# Patient Record
Sex: FEMALE | Race: OTHER | HISPANIC OR LATINO | Employment: FULL TIME | URBAN - METROPOLITAN AREA
[De-identification: names, ages, dates, MRNs, and addresses within clinical notes are randomized per-mention and may not be internally consistent; named-entity substitution may affect disease eponyms.]

---

## 2017-02-24 ENCOUNTER — ALLSCRIPTS OFFICE VISIT (OUTPATIENT)
Dept: OTHER | Facility: OTHER | Age: 32
End: 2017-02-24

## 2017-02-24 LAB — HCG, QUALITATIVE (HISTORICAL): NEGATIVE

## 2018-01-02 ENCOUNTER — HOSPITAL ENCOUNTER (EMERGENCY)
Facility: HOSPITAL | Age: 33
Discharge: HOME | End: 2018-01-02
Attending: EMERGENCY MEDICINE
Payer: COMMERCIAL

## 2018-01-02 ENCOUNTER — APPOINTMENT (EMERGENCY)
Dept: RADIOLOGY | Facility: HOSPITAL | Age: 33
End: 2018-01-02
Payer: COMMERCIAL

## 2018-01-02 VITALS
OXYGEN SATURATION: 99 % | SYSTOLIC BLOOD PRESSURE: 124 MMHG | RESPIRATION RATE: 18 BRPM | HEART RATE: 95 BPM | WEIGHT: 199.08 LBS | DIASTOLIC BLOOD PRESSURE: 83 MMHG | TEMPERATURE: 98.4 F

## 2018-01-02 DIAGNOSIS — J06.9 VIRAL UPPER RESPIRATORY ILLNESS: Primary | ICD-10-CM

## 2018-01-02 LAB — EXT PREG TEST URINE: NEGATIVE

## 2018-01-02 PROCEDURE — 81025 URINE PREGNANCY TEST: CPT | Performed by: PHYSICIAN ASSISTANT

## 2018-01-02 PROCEDURE — 71046 X-RAY EXAM CHEST 2 VIEWS: CPT

## 2018-01-02 PROCEDURE — 99283 EMERGENCY DEPT VISIT LOW MDM: CPT

## 2018-01-02 RX ORDER — PROMETHAZINE HYDROCHLORIDE AND CODEINE PHOSPHATE 6.25; 1 MG/5ML; MG/5ML
5 SYRUP ORAL EVERY 4 HOURS PRN
Qty: 120 ML | Refills: 0 | Status: SHIPPED | OUTPATIENT
Start: 2018-01-02 | End: 2018-01-12

## 2018-01-02 NOTE — ED PROVIDER NOTES
History  Chief Complaint   Patient presents with    Fever - 9 weeks to 74 years     Fever (89) and cough for the past week  Patient presents emergency department with upper respiratory symptoms  She has had 1-2 weeks cough and some subjective fevers  Daughter also has similar symptoms  He taking over-the-counter medications not cough  None       History reviewed  No pertinent past medical history  History reviewed  No pertinent surgical history  History reviewed  No pertinent family history  I have reviewed and agree with the history as documented  Social History   Substance Use Topics    Smoking status: Never Smoker    Smokeless tobacco: Never Used    Alcohol use No        Review of Systems   All other systems reviewed and are negative  Physical Exam  ED Triage Vitals [01/02/18 1313]   Temperature Pulse Respirations Blood Pressure SpO2   98 4 °F (36 9 °C) 95 18 124/83 99 %      Temp Source Heart Rate Source Patient Position - Orthostatic VS BP Location FiO2 (%)   Temporal -- -- -- --      Pain Score       No Pain           Orthostatic Vital Signs  Vitals:    01/02/18 1313   BP: 124/83   Pulse: 95       Physical Exam   Constitutional: She is oriented to person, place, and time  She appears well-developed  HENT:   Right Ear: External ear normal    Eyes: Conjunctivae and EOM are normal    Neck: Neck supple  Cardiovascular: Normal rate, regular rhythm and normal heart sounds  Pulmonary/Chest: Effort normal and breath sounds normal    Abdominal: Soft  Bowel sounds are normal    Neurological: She is alert and oriented to person, place, and time  Skin: Skin is warm  Psychiatric: She has a normal mood and affect  Her behavior is normal    Nursing note and vitals reviewed        ED Medications  Medications - No data to display    Diagnostic Studies  Results Reviewed     Procedure Component Value Units Date/Time    POCT pregnancy, urine [[id number]]  (Normal) Resulted: 01/02/18 1349    Lab Status:  Final result Updated:  01/02/18 1349     EXT PREG TEST UR (Ref: Negative) negative                 XR chest 2 views   ED Interpretation by Ezekiel Post PA-C (01/02 5834)   DEDIRE                 Procedures  Procedures       Phone Contacts  ED Phone Contact    ED Course  ED Course                                MDM  Number of Diagnoses or Management Options  Viral upper respiratory illness: new and requires workup     Amount and/or Complexity of Data Reviewed  Independent visualization of images, tracings, or specimens: yes    Patient Progress  Patient progress: improved    CritCare Time    Disposition  Final diagnoses:   Viral upper respiratory illness     Time reflects when diagnosis was documented in both MDM as applicable and the Disposition within this note     Time User Action Codes Description Comment    1/2/2018  2:22 PM Tiffanie Christianson [J06 [id number],  B97 [id number]] Viral upper respiratory illness       ED Disposition     ED Disposition Condition Comment    Discharge  Carol Abarca discharge to home/self care  Condition at discharge: Good        Follow-up Information     Follow up With Specialties Details Why [address], [place] [facility]   [address] [place] [address]  49  [phone]          Patient's Medications   Discharge Prescriptions    PROMETHAZINE-CODEINE (PHENERGAN WITH CODEINE) 6 25-10 MG/5 ML SYRUP    Take 5 mL by mouth every 4 (four) hours as needed for cough for up to 10 days       Start Date: 1/2/2018  End Date: 1/12/2018       Order Dose: 5 mL       Quantity: 120 mL    Refills: 0     No discharge procedures on file      ED Provider  Electronically Signed by           Ezekiel Post PA-C  01/02/18 9868

## 2018-01-02 NOTE — DISCHARGE INSTRUCTIONS
Tylenol or Motrin for fevers/pain  Saline spray for congestion you may use Mucinex for cough and congestion increase, fluids follow-up with the family doctor  Return to the emergency department for worsening symptoms  Síndrome viral   LO QUE NECESITA SABER:   El síndrome viral es un término general usado para describir juan c infección viral que no tiene juan c causa definida  Los virus son propagados fácilmente de Whitley Welsh persona a otra a través del aire y [place] los objetos que se comparten  INSTRUCCIONES SOBRE [facility]ITAL[facility]:   Llame al 911 en shayne de presentar lo siguiente:   · Usted sufre juan c convulsión  · No es posible despertarlo  · Usted tiene dolor torácico y dificultad para respirar  Regrese a la camron de emergencias si:   · Usted tiene rigidez en el delores, dolor de sd intenso y sensibilidad a la bernard  · Usted se siente débil, mareado o confundido  · Usted yoselin de orinar u orina menos de lo normal      · Usted tose wayne o juan c mucosidad espesa amarilla o silvia  · Usted tiene dolor abdominal severo o harman abdomen está más sebas de lo habitual   Pregúntele a harman médico qué vitaminas y minerales son adecuados para usted  · Tamara síntomas no mejoran con el tratamiento o empeoran después de 3 días  · Usted tiene salpullido o dolor de oído  · Usted siente ardor al Catarina Smith  · Usted tiene preguntas o inquietudes acerca de harman condición o cuidado  Medicamentos:  Es posible que usted necesite alguno de los siguientes:  · El acetaminofén  vito el dolor y baja la fiebre  Está disponible sin receta médica  Pregunte cuánto medicamento debe newton y con qué frecuencia  [address]  El acetaminofén puede causar daño en el hígado cuando no se saida de forma correcta  · AINEs (Analgésicos antiinflamatorios no esteroides) lily el ibuprofeno, ayudan a disminuir la inflamación, el dolor y la [place]   Los AINEs pueden causar sangrado estomacal o problemas renales en ciertas personas  Si usted saida un medicamento anticoagulante, siempre pregúntele a harman médico si los AMELIA son seguros para usted  Siempre evie la etiqueta de ese medicamento y [place] instrucciones  · El medicamento para el resfriado  ayuda a disminuir la inflamación, a controlar la tos y a aliviar la congestión del pecho o nasal      · El rociador nasal de agua salina  ayuda a disminuir la congestión nasal      · [place] colin medicamentos lily se le haya indicado  Consulte con harman médico si usted macie que harman medicamento no le está ayudando o si presenta efectos secundarios  Infórmele si es alérgico a algún medicamento  Mantenga juan c lista actualizada de los [place], las vitaminas y los productos herbales que saida  Incluya los siguientes datos de los medicamentos: cantidad, frecuencia y motivo de administración  Traiga con usted la lista o los envases de la píldoras a colin citas de seguimiento  Lleve la lista de los medicamentos con usted en shayne de juan c emergencia  El manejo de harman síntomas:   · Consuma líquidos según le indicaron  para evitar la deshidratación  Pregunte cuánto líquido debe newton cada día y cuáles líquidos son los más adecuados para usted  Pregunte si usted debería newton juan c solución de rehidratación oral (SRO)  [address] cantidades exactas de agua, sal y azúcar que usted necesita para reemplazar los líquidos corporales  [place] podría ayudarlo a evitar la deshidratación a causa del vómito y de la diarrea  No tome líquidos con cafeína  Los líquidos con cafeína pueden empeorar la deshidratación  · Descanse lo suficiente  para ayudar a que harman cuerpo sane  [place] siestas aditya el día  Pregunte a harman médico cuándo puede regresar a harman trabajo y a colin actividades cotidianas  · Use un humidificador de anuja frío  para ayudarlo a respirar más fácilmente si usted tiene congestión nasal o del pecho  Pregunte a harman médico cómo usar un humidificador de vapor frío       · Coma miel o use caramelos para la tos  para ayudar a disminuir la molestia de la garganta  Pregunte a awad médico cuánta miel debería comer cada día  Los caramelos para la tos están disponibles sin receta médica  Siga las indicaciones para newton los caramelos para la tos  · No fume y permanezca lejos de otras personas que fuman  La nicotina y otras sustancias químicas que contienen los cigarrillos y cigarros pueden dañar los pulmones  El fumar también puede retrasar la sanación  Pida información a awad médico si usted actualmente fuma y necesita ayuda para dejar de fumar  Los cigarrillos electrónicos o tabaco sin humo todavía contienen nicotina  Consulte con awad médico antes de [employer]  · Lávese las dilcia frecuentemente  para evitar la propagación de gérmenes a otras personas  Utilice agua y [place]  Use gel antibacterial cuando no tenga jabón ni agua disponibles  [employer] las dilcia después de usar el baño, toser o estornudar  Lávese las dilcia antes de comer o preparar alimentos  Acuda a colin consultas de control con awad médico según le indicaron  Anote colin preguntas para que se acuerde de hacerlas aditya colin visitas  © 2017 [facility] Information is for End User's use only and may not be sold, redistributed or otherwise used for commercial purposes  All illustrations and images included in CareNotes® are the copyrighted property of A D A M , Inc  or Doug Ospina  Esta información es sólo para uso en educación  Awad intención no es darle un consejo médico sobre enfermedades o tratamientos  Colsulte con awad [place] farmacéutico antes de seguir cualquier régimen médico para saber si es seguro y efectivo para usted  Infección respiratoria superior   LO QUE NECESITA SABER:   [place] infección respiratoria superior también se conoce lily el resfriado común  Brittany es alexa infección que puede afectar awad nariz, garganta, oídos y los senos frontales   Para personas saludables, el resfriado común generalmente no es grave y no requiere tratamiento especial  Los síntomas del resfriado generalmente son [place] graves aditya los primeros 3 a 5 días  [address] en 7 a 14 días  Puede que usted siga tosiendo por 2 a 3 semanas  Los resfriados son provocados por virus y no mejoran con antibióticos  INSTRUCCIONES SOBRE EL LU[facility]NA [facility]OSPITALARIA:   Regrese a la camron de emergencias si:   · Usted tiene dolor torácico y dificultad para respirar  Pregúntele a harman Dima Obey vitaminas y minerales son adecuados para usted  · Usted tiene fiebre de más de 102ºF [facility])  · Harman garganta irritada empeora o usted ve manchas danna o jeanette en harman garganta  · Tamara síntomas empeoran después de 3 a 5 días o harman resfriado no mejora en 14 días  · Usted tiene sarpullido en alguna parte de harman piel  · Usted tiene bultos grandes y sensible en harman delores    · Usted tiene secreción espesa de color silvia o amarillo proveniente de harman nariz  · Usted expectora mucosidad de color amarillo, silvia o con [place]  · Usted vomita por más de 24 horas y no puede retener líquidos en el estómago  · Usted tiene un grave dolor de oído  · Usted tiene preguntas o inquietudes acerca de harman condición o cuidado  Medicamentos:  Es posible que usted necesite alguno de los siguientes:  · Los descongestionantes  ayudan a reducir la congestión nasal y Erick Scarlet a respirar más fácilmente  Si saida pastillas descongestionantes, pueden causarle agitación o problemas para dormir  No use aerosol descongestionante por más de unos cuantos días  · Los jarabes para la tos  ayudan a reducir la tos  Pregúntele a harman médico cuál tipo de medicamento para la tos es mejor para usted  · AINEs (Analgésicos antiinflamatorios no esteroides) lily el ibuprofeno, ayudan a disminuir la inflamación, el dolor y la [place]  Los AINEs pueden causar sangrado estomacal o problemas renales en ciertas personas   Si usted saida un medicamento anticoagulante, siempre pregúntele a harman médico si los AMELIA son seguros para usted  Siempre melanie la etiqueta de ese medicamento y [place] instrucciones  · Acetaminofeno:  vito el dolor y baja la fiebre  Está disponible sin receta médica  Pregunte la cantidad y la frecuencia con que debe tomarlos  [address]  Melanie las etiquetas de todos los demás medicamentos que esté usando para saber si también contienen acetaminofén, o pregunte a harman médico o farmacéutico  El acetaminofén puede causar daño en el hígado cuando no se saida de forma correcta  No use más de 4 gramos (4000 miligramos) en total de acetaminofeno en un día  · [place] colin medicamentos lily se le haya indicado  Consulte con harman médico si usted macie que harman medicamento no le está ayudando o si presenta efectos secundarios  Infórmele si es alérgico a cualquier medicamento  Mantenga juan c lista actualizada de los [place], las vitaminas y los productos herbales que saida  Incluya los siguientes datos de los medicamentos: cantidad, frecuencia y motivo de administración  Traiga con usted la lista o los envases de la píldoras a colin citas de seguimiento  Lleve la lista de los medicamentos con usted en shayne de juan c emergencia  Acuda a colin consultas de control con harman médico según le indicaron  Anote colin preguntas para que se acuerde de hacerlas aditya colin visitas  Cuidados personales:   · Descanse el mayor tiempo posible  Empiece a hacer un poco más día a día  · [employer] líquidos lily se le indique  Los líquidos le ayudarán a aflojar y disolver la mucosidad para que la pueda expectorar  Los líquidos ayudarán a evitar la deshidratación  Los líquidos que ayudan a prevenir la deshidratación pueden ser Blinda Knife y caldo  No tome líquidos que contienen cafeína  La cafeína puede aumentar el riesgo de deshidratación  Pregúntele a harman médico cuál es la cantidad de líquido que necesita ingerir a diario  · Alivie el dolor de garganta  [place] gárgaras de agua tibia con sal  [employer] ayuda a aliviar el dolor de garganta  Prepare agua salina disolviendo ¼ de cucharada de sal a 1 taza de agua tibia  Usted puede también chupar dulces duros o pastillas para la garganta  Usted puede usar aerosol para el dolor de garganta  · Use un humidificador o vaporizador  Use un humidificador de anuja frío o un vaporizador para elevar la humedad en harman casa  [place] podría ayudarle a respirar más fácilmente y al mismo tiempo disminuir la tos  · Use gotas nasales de solución salina lily se le haya indicado  Estas ayudan a [place]  · Aplique vaselina en la parte externa alrededor de las fosas nasales  Esta puede disminuir la irritación de sonarse la nariz  · No fume  La nicotina y otros químicos en los cigarrillos y cigarros pueden empeorar colin síntomas  También pueden causar infecciones lily la bronquitis o la [place]  Pida información a harman médico si usted actualmente fuma y necesita ayuda para dejar de fumar  Los cigarrillos electrónicos o tabaco sin humo todavía contienen nicotina  Consulte con harman médico antes de [employer]  Evite transmitir harman resfriado a los demás:   · Trate de mantenerse alejado de otras personas aditya los primeros 2 a 3 días de harman resfriado cuando éste es más fácil de transmitir  · No comparta alimentos o bebidas  · No comparta toallas de mano con otros miembros de marlena en el hogar  · [employer] frecuentemente, especialmente después de sonarse la nariz  Tom la espalda a otras personas y cúbrase la boca y la nariz con un pañuelo desechable cuando estornuda o tose  © 2017 [address] Information is for End User's use only and may not be sold, redistributed or otherwise used for commercial purposes  All illustrations and images included in CareNotes® are the copyrighted property of A D A M , Inc  or Doug Ospina    Esta información es sólo para uso en educación  Harman intención no es darle un consejo médico sobre enfermedades o tratamientos  Colsulte con harman [place] farmacéutico antes de seguir cualquier régimen médico para saber si es seguro y efectivo para usted

## 2018-01-11 ENCOUNTER — HOSPITAL ENCOUNTER (EMERGENCY)
Facility: HOSPITAL | Age: 33
Discharge: HOME | End: 2018-01-11
Admitting: EMERGENCY MEDICINE
Payer: COMMERCIAL

## 2018-01-11 VITALS
HEART RATE: 93 BPM | RESPIRATION RATE: 16 BRPM | WEIGHT: 200.4 LBS | OXYGEN SATURATION: 100 % | DIASTOLIC BLOOD PRESSURE: 63 MMHG | SYSTOLIC BLOOD PRESSURE: 135 MMHG | TEMPERATURE: 97.8 F

## 2018-01-11 DIAGNOSIS — Z32.01 POSITIVE PREGNANCY TEST: Primary | ICD-10-CM

## 2018-01-11 LAB — EXT PREG TEST URINE: POSITIVE

## 2018-01-11 PROCEDURE — 99283 EMERGENCY DEPT VISIT LOW MDM: CPT

## 2018-01-11 PROCEDURE — 81025 URINE PREGNANCY TEST: CPT | Performed by: PHYSICIAN ASSISTANT

## 2018-01-11 NOTE — DISCHARGE INSTRUCTIONS
Un embarazo,   LO QUE NECESITA SABER:   Un embarazo normal dura alrededor de 40 semanas  El primer trimestre dura desde harman último periodo hasta la semana 12 de [place]  El keith trimestre se extiende desde la semana 13 de harman embarazo hasta la semana 23  El tercer trimestre se extiende desde la semana 24 de embarazo hasta que nazca harman bebé  Si usted conoce la fecha de harman último periodo, harman médico puede calcular la fecha de nacimiento de harman bebé  Es posible que usted de a bernard a harman bebé en cualquier momento desde la semana 37 hasta 2 semanas después de la fecha calculada de Lonnie  INSTRUCCIONES SOBRE EL [facility]ISA[facility] HOSPITALARIA:   Regrese a la camron de emergencias si:   · Usted presenta un lindy dolor de sd que no desaparece  · Usted tiene cambios en la visión nuevos o en aumento, lily visión borrosa o con manchas  · Usted tiene inflamación nueva o creciente en harman peewee o dilcia  · Usted tiene dolor o cólicos en el abdomen o la parte baja de la espalda  · Usted tiene sangrado vaginal   Comuníquese con harman médico o obstreta si:   · Usted tiene calambres, presión o tensión abdominal     · Usted tiene un cambio en la secreción vaginal     · Usted no puede retener alimentos ni líquidos y está perdiendo [place]  · Usted tiene escalofríos o fiebre  · Usted tiene comezón, ardor o dolor vaginal      · Usted tiene juan c secreción vaginal amarillenta, verdosa, lazaro o de [employer]  · Usted tiene dolor o ardor al Alyssa Yola, orina menos de lo habitual o tiene [place] rosada o sanguinolenta  · Usted tiene preguntas o inquietudes acerca de harman condición o cuidado  Medicamentos:   · Las vitaminas prenatales  suministran parte de las vitaminas y minerales adicionales que usted necesita aditya el [facility]  Las vitaminas prenatales también podrían ayudar a disminuir el riesgo de ciertos defectos de nacimiento  · Randall colin medicamentos lily se le haya indicado    Consulte con harman médico si usted macie que harman medicamento no le está ayudando o si presenta efectos secundarios  Infórmele si es alérgico a cualquier medicamento  Mantenga juan c lista actualizada de los [place], las vitaminas y los productos herbales que saida  Incluya los siguientes datos de los medicamentos: cantidad, frecuencia y motivo de administración  Traiga con usted la lista o los envases de la píldoras a colin citas de seguimiento  Lleve la lista de los medicamentos con usted en shayne de juan c emergencia  Programe un control con harman médico u obstetra según lo indicado:  Vaya a todas colin citas prenatales aditya harman embarazo  Anote colin preguntas para que se acuerde de hacerlas aditya colin visitas  Cambios corporales que pueden ocurrir aditya harman embarazo:   · Los cambios en los senos  que usted Daniela Hamper sensibilidad y cosquilleo aditya la primera parte de harman [place]  Los senos se volverán más grandes  Es posible que necesite un sostén con soporte  Es posible que usted addison K[place]i [place] secreción delgada y YAMIL, conocida lily calostro, que sale de colin pezones aditya el keith trimestre  El calostro es un líquido que se convertirá en [employer] alrededor de 3 días después de usted jenny dado a bernard  · Cambios en la piel y estrías  podrían ocurrir aditya harman embarazo  Es posible que usted tenga marcas velazquez, conocidas lily estrías, en harman piel  Las [employer] se desvanecen después del [place]  Utilice crema si harman piel está seca y con comezón  La piel de harman peewee, alrededor de los pezones y debajo de harman ombligo podría oscurecerse  La mayoría del [place], harman piel Rosalene Gather a harman color normal después del nacimiento de harman bebé  · El malestar matutino  consiste en náuseas y vómitos que pueden ocurrir en cualquier momento del día  Evite los alimentos grasosos y picantes  Coma comidas pequeñas aditya el día en vez de porciones grandes  El jengibre puede ayudar a [employer]   Consulte con harman médico acerca de otras formas para disminuir las náuseas y el vómito  · Acidez estomacal  puede ser causada por los cambios hormonales aditya harman [facility]  El [place] Sinner en crecimiento puede empujar harman estómago hacia arriba y forzar ácido estomacal a acumularse dentro de harman esófago  [place] 4 o 5 comidas pequeñas cada día en vez de comidas grandes  Evite los alimentos picantes  Evite comer xavier antes de irse a la cama  · Estreñimiento  puede desarrollarse aditya harman embarazo  Para tratar el estreñimiento, coma alimentos altos en fibra lily cereales con fibra, frijoles, frutas, verduras, panes integrales y [place]  Deretha Bethesda de Tonya regular y tome suficiente agua  Es posible que harman médico sugiera un suplemento con fibra para ablandar colin evacuaciones intestinales  Consulte con harman médico antes de usar cualquier medicamento para disminuir el estreñimiento  · Las hemorroides  son Ardia Reddish grandes en el área rectal  Pueden causar dolor, comezón y sangrado de color tirado vivo en harman recto  Para disminuir el riesgo de hemorroides, prevenga el estreñimiento y no se esfuerce cuando tenga juan c evacuación intestinal  Si usted tiene hemorroides, sumérjase en juan c bañera con agua tibia para aliviar la incomodidad  Consulte con harman médico cómo puede tratar las hemorroides  · Los calambres y la hinchazón en las piernas  pueden ser causados por niveles bajos de calcio o por el peso adicional del [facility]  Eleve colin piernas por encima del nivel de harman corazón para disminuir la hinchazón  Aditya un calambre en la pierna, estire o de un masaje al [employer] que tiene el calambre  El calor puede ayudar a disminuir el dolor y los espasmos musculares  Aplique calor sobre el músculo por 20 a 30 minutos cada 2 horas por la cantidad de días que se le indique  · Dolor en la espalda  puede ocurrir a medida que harman bebé crece  No esté de pie por largos periodos de tiempo ni levante objetos pesados  Use juan c buena postura mientras esté de pie, se agache o se doble   Use zapatos de tacón bajo con un buen soporte  Descansar puede también ayudarla a aliviar el dolor de espalda  Pregunte a harman médico acerca de ejercicios que usted pueda hacer para fortalecer los músculos de harman espalda  Manténgase saludable aditya harman embarazo:   · Consuma alimentos saludables y variados  Alimentos saludables incluyen frutas, verduras, panes de yolette integral, alimentos lácteos bajos en grasa, frijoles, kehinde magras y pescado  [place] líquidos lily se le haya indicado  Pregunte cuánto líquido debe [place] cada día y cuáles líquidos son los más adecuados para usted  Limite el consumo de cafeína a menos de [place] [address]  Limite el consumo de pescado a 2 porciones cada semana  Escoja pescado con concentraciones bajas de dottie lily atún ligero enlatado, camarón, cangrejo, salmón, bacalao o tilapia  No  coma pescado con concentraciones altas de dottie lily pez jasmina, caballa gigante, pargo rayado y tiburón  · [id number]  Harman necesidad de ciertas vitaminas y [address], lily el ácido fólico, aumenta aditya el [facility]  Las vitaminas prenatales proporcionan algunas de las vitaminas y minerales adicionales que usted necesita  Las vitaminas prenatales también podrían ayudar a disminuir el riesgo de ciertos defectos de nacimiento  · Pregunte cuánto peso usted debe aumentar aditya harman embarazo  Demasiado aumento de peso o muy poco puede ser poco saludable para usted y harman bebé  · Consulte con harman médico acerca de hacer ejercicio  El ejercicio moderado puede ayudarla a mantenerse en forma  Harman médico la ayudará a planear un programa de ejercicios que sea seguro para usted aditya harman [place]  · No fume  Si usted fuma, nunca es demasiado tarde para dejar de hacerlo  Fumar aumenta el riesgo de aborto espontáneo y otros problemas de lorri aditya harman [place]  Fumar puede causar que harman bebé nazca antes de tiempo o que pese menos al nacer   Solicite información a harman médico si usted necesita ayuda para dejar de fumar  · No consuma alcohol  El alcohol pasa de harman cuerpo al bebé a través de la placenta  Puede afectar el desarrollo del cerebro de harman bebé y provocar el síndrome de alcoholismo fetal (SAF)  FAS es un malena de condiciones que causan [address], de comportamiento y de crecimiento  · Consulte con harman médico antes de [place]ton cualquier medicamento  Muchos medicamentos pueden perjudicar a harman bebé si usted los saida [address]  No tome ningún medicamento, vitaminas, hierbas o suplementos sin idris consultar con harman Marlyn Bibber  use drogas ilegales o de la guzman (lily marihuana o cocaína) mientras está embarazada  Consejos de seguridad:   · Evite jacuzzis y saunas  No use un jacuzzi o un sauna mientras usted está embarazada, especialmente aditya el primer trimestre  Los [facility] y los saunas aumentan la temperatura de harman bebé y el riesgo de defectos de nacimiento  · Evite la toxoplasmosis  [place] es juan c infección causada por comer carne cruda o estar cerca del excremento de un paz infectado  [place] puede causar malformaciones congénitas, aborto espontáneo y Ciro Schein  Lávese las dilcia después de tocar carne cruda  Asegúrese de que la carne esté audrey cocida antes de comerla  Evite los huevos crudos y la Tahir Hima  Use guantes o pida que alguien la ayude a limpiar la caja de arena del paz mientras usted Joelene Polo  · Consulte con harman médico acerca de viajar  El [address] cómodo para viajar es aditya el keith trimestre  Pregunte a harman médico si usted puede viajar después de las 36 semanas  Es posible que no pueda viajar en avión después de las 36 [facility]  También le puede recomendar que evite largos viajes por black  © 2017 [address] Information is for End User's use only and may not be sold, redistributed or otherwise used for commercial purposes   All illustrations and images included in CareNotes® are the copyrighted property of A D A M , Inc  or Doug Ospina  Esta información es sólo para uso en educación  Harman intención no es darle un consejo médico sobre enfermedades o tratamientos  Colsulte con harman Hudson Keas farmacéutico antes de seguir cualquier régimen médico para saber si es seguro y efectivo para usted  DISCHARGE INSTRUCTIONS:    FOLLOW UP WITH YOUR PRIMARY CARE PROVIDER OR THE [address] [place] [address]  MAKE AN APPOINTMENT TO BE SEEN  FOLLOW UP WITH THE [facility] [place] [facility] FOR POSITIVE PREGNANCY  REST AND DRINK PLENTY OF FLUIDS  IF SYMPTOMS WORSEN OR NEW SYMPTOMS ARISE, RETURN TO THE ER TO BE SEEN

## 2018-01-11 NOTE — ED NOTES
Pt reports two +HCG tests, reports they are concerned because pt had x-ray on the 2nd of Sajan Esquivel Poster, RN  01/11/18 7173

## 2018-01-11 NOTE — ED PROVIDER NOTES
History  Chief Complaint   Patient presents with    Pregnancy Test    Nausea     32y  o female with no significant PMH presents to the ER for possible pregnancy  Patient's guest states she was seen here last week for a viral syndrome  At the time, she thought she may be pregnant so she requested a pregnancy test be completed  The pregnancy test was negative at the time  Patient went home and took 2 more pregnancy tests, which were positive  Patient presents to the ER today for a repeat pregnancy test to ensure she really is pregnant  Patient denies any pain, vaginal bleeding or discharge  Associated symptoms: nausea  She denies fever, chills, chest pain, dyspnea, vomiting, diarrhea, weakness or paresthesias  History provided by:  Patient   used: No        Prior to Admission Medications   Prescriptions Last Dose Informant Patient Reported? Taking?   promethazine-codeine (PHENERGAN WITH CODEINE) 6 25-10 mg/5 mL syrup   No No   Sig: Take 5 mL by mouth every 4 (four) hours as needed for cough for up to 10 days      Facility-Administered Medications: None       History reviewed  No pertinent past medical history  History reviewed  No pertinent surgical history  History reviewed  No pertinent family history  I have reviewed and agree with the history as documented  Social History   Substance Use Topics    Smoking status: Never Smoker    Smokeless tobacco: Never Used    Alcohol use No        Review of Systems   Constitutional: Negative for chills and fever  Respiratory: Negative for shortness of breath  Cardiovascular: Negative for chest pain  Gastrointestinal: Positive for nausea  Negative for abdominal pain, diarrhea and vomiting  Musculoskeletal: Negative for neck stiffness  Skin: Negative for rash  Allergic/Immunologic: Negative for food allergies  Neurological: Negative for weakness and numbness         Physical Exam  ED Triage Vitals [01/11/18 1347] Temperature Pulse Respirations Blood Pressure SpO2   97 8 °F (36 6 °C) 93 16 135/63 100 %      Temp Source Heart Rate Source Patient Position - Orthostatic VS BP Location FiO2 (%)   Oral -- Sitting Right arm --      Pain Score       No Pain           Orthostatic Vital Signs  Vitals:    01/11/18 1347   BP: 135/63   Pulse: 93   Patient Position - Orthostatic VS: Sitting       Physical Exam   Constitutional:  Non-toxic appearance  No distress  HENT:   Head: Normocephalic and atraumatic  Right Ear: Tympanic membrane, external ear and ear canal normal  No drainage, swelling or tenderness  No foreign bodies  Tympanic membrane is not erythematous  No hemotympanum  Left Ear: Tympanic membrane, external ear and ear canal normal  No drainage, swelling or tenderness  No foreign bodies  Tympanic membrane is not erythematous  No hemotympanum  Nose: Nose normal    Mouth/Throat: Uvula is midline, oropharynx is clear and moist and mucous membranes are normal  No uvula swelling  No posterior oropharyngeal edema, posterior oropharyngeal erythema or tonsillar abscesses  No tonsillar exudate  Neck: Normal range of motion and phonation normal  Neck supple  No tracheal deviation present  Cardiovascular: Normal rate, regular rhythm, S1 normal, S2 normal and normal heart sounds  Exam reveals no gallop and no friction rub  No murmur heard  Pulmonary/Chest: Effort normal and breath sounds normal  No respiratory distress  She has no decreased breath sounds  She has no wheezes  She has no rhonchi  She has no rales  She exhibits no tenderness  Abdominal: Soft  Bowel sounds are normal  There is no tenderness  There is no rebound and no guarding  Neurological: She is alert  GCS eye subscore is 4  GCS verbal subscore is 5  GCS motor subscore is 6  Skin: Skin is warm and dry  No rash noted  Psychiatric: She has a normal mood and affect  Nursing note and vitals reviewed        ED Medications  Medications - No data to display    Diagnostic Studies  Results Reviewed     Procedure Component Value Units Date/Time    POCT pregnancy, urine [[id number]]  (Normal) Resulted:  01/11/18 1402    Lab Status:  Final result Specimen:  Urine Updated:  01/11/18 1402     EXT PREG TEST UR (Ref: Negative) positive                 No orders to display              Procedures  Procedures       Phone Contacts  ED Phone Contact    ED Course  ED Course                                MDM  Number of Diagnoses or Management Options  Positive pregnancy test: new and requires workup  Diagnosis management comments: DDX consists of but not limited to: pregnancy, nausea, viral syndrome    Will check pregnancy test     At discharge, I instructed the patient to:  -follow up with pcp  -follow up with the recommended women's clinic  -rest and drink plenty of fluids  -return to the ER if symptoms worsened or new symptoms arose  Patient agreed to this plan and was stable at time of discharge  Amount and/or Complexity of Data Reviewed  Clinical lab tests: ordered and reviewed    Patient Progress  Patient progress: stable    CritCare Time    Disposition  Final diagnoses:   Positive pregnancy test     Time reflects when diagnosis was documented in both MDM as applicable and the Disposition within this note     Time User Action Codes Description Comment    1/11/2018  2:08 PM Austin Mendoza Add [Z32 01] Positive pregnancy test       ED Disposition     ED Disposition Condition Comment    Discharge  Carol Abarca discharge to home/self care      Condition at discharge: Stable        Follow-up Information     Follow up With Specialties Details Why Dom Chance MD [facility] Medicine Schedule an appointment as soon as possible for a visit in 1 day  [address] [place] [address] Jean-Paul Colindres Obstetric[facility] and Gynecology Schedule an appointment as soon as possible for a visit in 1 day positive pregnancy [place]  [id number]  [phone]        Discharge Medication List as of 1/11/2018  2:09 PM      CONTINUE these medications which have NOT CHANGED    Details   promethazine-codeine (PHENERGAN WITH CODEINE) 6 25-10 mg/5 mL syrup Take 5 mL by mouth every 4 (four) hours as needed for cough for up to 10 days, Starting Tue 1/2/2018, Until Fri 1/12/2018, Print           No discharge procedures on file      ED Provider  Electronically Signed by           Sivakumar Antonio PA-C  01/11/18 2635

## 2018-01-14 VITALS — WEIGHT: 203 LBS | DIASTOLIC BLOOD PRESSURE: 69 MMHG | BODY MASS INDEX: 32.76 KG/M2 | SYSTOLIC BLOOD PRESSURE: 122 MMHG

## 2018-02-02 ENCOUNTER — INITIAL PRENATAL (OUTPATIENT)
Dept: OBGYN CLINIC | Facility: CLINIC | Age: 33
End: 2018-02-02
Payer: COMMERCIAL

## 2018-02-02 VITALS
SYSTOLIC BLOOD PRESSURE: 110 MMHG | HEART RATE: 83 BPM | DIASTOLIC BLOOD PRESSURE: 75 MMHG | HEIGHT: 67 IN | BODY MASS INDEX: 31.77 KG/M2 | WEIGHT: 202.4 LBS

## 2018-02-02 DIAGNOSIS — Z23 IMMUNIZATION DUE: ICD-10-CM

## 2018-02-02 DIAGNOSIS — Z3A.08 8 WEEKS GESTATION OF PREGNANCY: Primary | ICD-10-CM

## 2018-02-02 PROCEDURE — PNV: Performed by: OBSTETRICS & GYNECOLOGY

## 2018-02-02 PROCEDURE — 90471 IMMUNIZATION ADMIN: CPT | Performed by: OBSTETRICS & GYNECOLOGY

## 2018-02-02 PROCEDURE — 90686 IIV4 VACC NO PRSV 0.5 ML IM: CPT

## 2018-02-02 RX ORDER — PNV NO.95/FERROUS FUM/FOLIC AC 28MG-0.8MG
TABLET ORAL DAILY
COMMUNITY
End: 2018-10-22

## 2018-02-02 NOTE — PROGRESS NOTES
Intake done, scripts given for prenatal panel, varicella (unsure if she got immunized) and a sequential screen  Patient is going to call her insurance to see if they will pay for her to have the cystic fibrosis done  Patient received the influenza vaccine today

## 2018-02-15 ENCOUNTER — ROUTINE PRENATAL (OUTPATIENT)
Dept: OBGYN CLINIC | Facility: CLINIC | Age: 33
End: 2018-02-15
Payer: COMMERCIAL

## 2018-02-15 VITALS
BODY MASS INDEX: 31.79 KG/M2 | HEART RATE: 94 BPM | WEIGHT: 203 LBS | SYSTOLIC BLOOD PRESSURE: 113 MMHG | DIASTOLIC BLOOD PRESSURE: 75 MMHG

## 2018-02-15 DIAGNOSIS — Z3A.10 10 WEEKS GESTATION OF PREGNANCY: ICD-10-CM

## 2018-02-15 DIAGNOSIS — Z01.419 PAPANICOLAOU TEST, AS PART OF ROUTINE GYNECOLOGICAL EXAMINATION: ICD-10-CM

## 2018-02-15 DIAGNOSIS — Z11.3 SCREEN FOR SEXUALLY TRANSMITTED DISEASES: ICD-10-CM

## 2018-02-15 DIAGNOSIS — Z34.91 PRENATAL CARE IN FIRST TRIMESTER: Primary | ICD-10-CM

## 2018-02-15 DIAGNOSIS — Z72.51 HIGH-RISK SEXUAL BEHAVIOR: ICD-10-CM

## 2018-02-15 LAB
SL AMB  POCT GLUCOSE, UA: NORMAL
SL AMB POCT URINE PROTEIN: NORMAL

## 2018-02-15 PROCEDURE — PNV: Performed by: STUDENT IN AN ORGANIZED HEALTH CARE EDUCATION/TRAINING PROGRAM

## 2018-02-15 PROCEDURE — G0145 SCR C/V CYTO,THINLAYER,RESCR: HCPCS | Performed by: STUDENT IN AN ORGANIZED HEALTH CARE EDUCATION/TRAINING PROGRAM

## 2018-02-15 PROCEDURE — 87491 CHLMYD TRACH DNA AMP PROBE: CPT | Performed by: STUDENT IN AN ORGANIZED HEALTH CARE EDUCATION/TRAINING PROGRAM

## 2018-02-15 PROCEDURE — 87624 HPV HI-RISK TYP POOLED RSLT: CPT | Performed by: STUDENT IN AN ORGANIZED HEALTH CARE EDUCATION/TRAINING PROGRAM

## 2018-02-15 PROCEDURE — 87591 N.GONORRHOEAE DNA AMP PROB: CPT | Performed by: STUDENT IN AN ORGANIZED HEALTH CARE EDUCATION/TRAINING PROGRAM

## 2018-02-15 NOTE — PROGRESS NOTES
OB/GYN  PRENATAL H&P VISIT  Blanca Rapp  2/15/2018  2:20 PM    SUBJECTIVE  Patient is here for initial prenatal H&P Patient reports that her LMP was 12/5/2017  Patient is currently doing well with no complications or issues  Her previous pregnancy was an uncomplicated vaginal delivery  She denies vaginal bleeding, cramping, leakage, abnormal discharge  Review of Systems   Constitutional: Negative for chills and fever  Eyes: Negative for visual disturbance  Respiratory: Negative for shortness of breath  Cardiovascular: Negative for chest pain  Gastrointestinal: Negative for constipation, diarrhea, nausea and vomiting  Genitourinary: Negative for pelvic pain, urgency, vaginal bleeding, vaginal discharge and vaginal pain  Neurological: Negative for headaches  Past Medical History:   Diagnosis Date    Herpes     last outbreak was when she was pregnant in 2015       No Past Surgical History    Social History     Social History    Marital status:      Spouse name: N/A    Number of children: 1    Years of education: N/A     Social History Main Topics    Smoking status: Never Smoker    Smokeless tobacco: Never Used    Alcohol use Yes      Comment: before pregnant     Drug use: No    Sexual activity: Yes     Partners: Male     Birth control/ protection: None     Other Topics Concern    Not on file     Social History Narrative    No narrative on file       OBJECTIVE  Vitals:    02/15/18 1200   BP: 113/75   Pulse: 94     Physical Exam   Constitutional: She is oriented to person, place, and time  She appears well-developed and well-nourished  Genitourinary: Vagina normal and uterus normal    Neck: Normal range of motion  Neck supple  Cardiovascular: Normal rate and regular rhythm  Pulmonary/Chest: Effort normal and breath sounds normal    Abdominal: Soft  Bowel sounds are normal  She exhibits no distension  There is no tenderness  Musculoskeletal: Normal range of motion  Neurological: She is alert and oriented to person, place, and time  Skin: Skin is warm and dry  Psychiatric: She has a normal mood and affect  Vitals reviewed  Vulva: normal  Vagina: normal mucosa, normal discharge  Cervix: no bleeding following Pap, no cervical motion tenderness and no lesions  Adnexa: normal adnexa  Uterus: normal size    IMAGING:   TVUS showed 3 04-3 13 cm at 10w0d; FHT was 167 bpm  Single viable IUP  No gross abnormalities noted  LABS:  Prenatal labs not completed yet  ASSESSMENT AND PLAN    29 y o , , with /75   Pulse 94   Wt 92 1 kg (203 lb)   LMP 2017 (Exact Date) , at Gestational Age: 10w2d here for her prenatal H&P       1  Pregnancy: H&P completed today  PN Labs still need to be completed  Final dating via LMP  TVUS showed single viable IUP at 10w2d, FHT was 167  Labor expectations discussed with patient, including appointment schedule, nutrition, weight gain, medications, and nausea/vomiting  2  Screening: pap smear collected  GC/CT collected  Sequential screening reviewed with patient - will wait and see if insurance will cover  3  Consents: Delivery process including potential OVD and  reviewed  Consents signed today  4  Postpartum: patient plans on breastfeeding  Different methods of contraception were discussed with patient, including progesterone only oral pills, depo provera, nexplanon, mirena, and paragard  Patient would like to get a tubal  Will discuss at next visit  5  Follow up: RTC in 4 weeks  Precautions regarding labor, leakage, bleeding, and fetal movement reviewed      D/w Dr Vishal Agudelo  2/15/2018  2:22 PM

## 2018-02-16 LAB
CHLAMYDIA DNA CVX QL NAA+PROBE: NORMAL
N GONORRHOEA DNA GENITAL QL NAA+PROBE: NORMAL

## 2018-02-20 LAB
HPV RRNA GENITAL QL NAA+PROBE: NORMAL
LAB AP GYN PRIMARY INTERPRETATION: NORMAL
Lab: NORMAL

## 2018-03-06 ENCOUNTER — HOSPITAL ENCOUNTER (EMERGENCY)
Facility: HOSPITAL | Age: 33
Discharge: HOME | End: 2018-03-06
Attending: EMERGENCY MEDICINE | Admitting: EMERGENCY MEDICINE
Payer: COMMERCIAL

## 2018-03-06 VITALS
SYSTOLIC BLOOD PRESSURE: 127 MMHG | TEMPERATURE: 98.3 F | DIASTOLIC BLOOD PRESSURE: 68 MMHG | HEART RATE: 103 BPM | BODY MASS INDEX: 31.95 KG/M2 | WEIGHT: 204 LBS | RESPIRATION RATE: 16 BRPM | OXYGEN SATURATION: 100 %

## 2018-03-06 DIAGNOSIS — R10.2 PAIN OF ROUND LIGAMENT DURING PREGNANCY: Primary | ICD-10-CM

## 2018-03-06 DIAGNOSIS — Z3A.13 13 WEEKS GESTATION OF PREGNANCY: ICD-10-CM

## 2018-03-06 DIAGNOSIS — O26.899 PAIN OF ROUND LIGAMENT DURING PREGNANCY: Primary | ICD-10-CM

## 2018-03-06 LAB
BACTERIA UR QL AUTO: ABNORMAL /HPF
BILIRUB UR QL STRIP: NEGATIVE
CLARITY UR: CLEAR
COLOR UR: YELLOW
GLUCOSE UR STRIP-MCNC: NEGATIVE MG/DL
HGB UR QL STRIP.AUTO: NEGATIVE
KETONES UR STRIP-MCNC: NEGATIVE MG/DL
LEUKOCYTE ESTERASE UR QL STRIP: NEGATIVE
NITRITE UR QL STRIP: NEGATIVE
NON-SQ EPI CELLS URNS QL MICRO: ABNORMAL /HPF
PH UR STRIP.AUTO: 5.5 [PH] (ref 4.5–8)
PROT UR STRIP-MCNC: ABNORMAL MG/DL
RBC #/AREA URNS AUTO: ABNORMAL /HPF
SP GR UR STRIP.AUTO: >=1.03 (ref 1–1.03)
UROBILINOGEN UR QL STRIP.AUTO: 0.2 E.U./DL
WBC #/AREA URNS AUTO: ABNORMAL /HPF

## 2018-03-06 PROCEDURE — 99284 EMERGENCY DEPT VISIT MOD MDM: CPT

## 2018-03-06 PROCEDURE — 81001 URINALYSIS AUTO W/SCOPE: CPT

## 2018-03-06 NOTE — ED PROVIDER NOTES
History  Chief Complaint   Patient presents with    Abdominal Pain Pregnant     [id number]  pt about 13 weeks pregnant reports now having lower abdominal pain, it was so bad she couldn't go to work today  denies bleeding or other sx, just pain  82-year-old  presents for evaluation of lower abdominal/pelvic discomfort beginning yesterday  The patient denies any associated fevers, chills, vomiting, diarrhea  The patient denies any vaginal bleeding or discharge  She states the pain is a pulling type of pain  Is worse with transitions  The patient did not take anything for the pain  The patient states that she was not able to go to work yesterday or today because of the pain  The patient denies any associated dysuria  She says that she called the OB clinic but because she was early enough pregnancy that they did not want to see her  History provided by:  Patient      Prior to Admission Medications   Prescriptions Last Dose Informant Patient Reported? Taking? Prenatal Vit-Fe Fumarate-FA (PRENATAL VITAMIN) 28-0 8 mg   Yes No   Sig: Take by mouth daily      Facility-Administered Medications: None       Past Medical History:   Diagnosis Date    Herpes     last outbreak was when she was pregnant in        History reviewed  No pertinent surgical history  Family History   Problem Relation Age of Onset    No Known Problems Mother     Cancer Father      I have reviewed and agree with the history as documented  Social History   Substance Use Topics    Smoking status: Never Smoker    Smokeless tobacco: Never Used    Alcohol use Yes      Comment: before pregnant         Review of Systems   Constitutional: Negative for chills and fever  Respiratory: Negative for shortness of breath  Cardiovascular: Negative for chest pain  Gastrointestinal: Positive for abdominal pain and nausea  Negative for vomiting  Genitourinary: Positive for pelvic pain   Negative for difficulty urinating, dysuria, vaginal bleeding, vaginal discharge and vaginal pain  All other systems reviewed and are negative  Physical Exam  ED Triage Vitals   Temperature Pulse Respirations Blood Pressure SpO2   03/06/18 1143 03/06/18 1143 03/06/18 1143 03/06/18 1143 03/06/18 1143   98 3 °F (36 8 °C) 103 16 127/68 100 %      Temp Source Heart Rate Source Patient Position - Orthostatic VS BP Location FiO2 (%)   03/06/18 1143 -- -- -- --   Oral          Pain Score       03/06/18 1147       7           Orthostatic Vital Signs  Vitals:    03/06/18 1143   BP: 127/68   Pulse: 103       Physical Exam   Constitutional: She is oriented to person, place, and time  She appears well-developed and well-nourished  No distress  HENT:   Head: Normocephalic and atraumatic  Right Ear: External ear normal    Left Ear: External ear normal    Eyes: Conjunctivae and EOM are normal  Pupils are equal, round, and reactive to light  No scleral icterus  Neck: Normal range of motion  Cardiovascular: Normal rate, regular rhythm and normal heart sounds  Pulmonary/Chest: Effort normal and breath sounds normal  No respiratory distress  Abdominal: Soft  Bowel sounds are normal  There is no tenderness  There is no rebound and no guarding  Musculoskeletal: Normal range of motion  She exhibits no edema  Neurological: She is alert and oriented to person, place, and time  Skin: Skin is warm and dry  No rash noted  Psychiatric: She has a normal mood and affect  Nursing note and vitals reviewed        ED Medications  Medications - No data to display    Diagnostic Studies  Results Reviewed     Procedure Component Value Units Date/Time    Urine Microscopic [[id number]]  (Abnormal) Collected:  03/06/18 1208    Lab Status:  Final result Specimen:  Urine from Urine, Clean Catch Updated:  03/06/18 1224     RBC, UA None Seen /hpf      WBC, UA 0-1 (A) /hpf      Epithelial Cells Occasional /hpf      Bacteria, UA Occasional /hpf     ED Urine Macroscopic [[id number]]  (Abnormal) Collected:  03/06/18 1208    Lab Status:  Final result Specimen:  Urine Updated:  03/06/18 1207     Color, UA Yellow     Clarity, UA Clear     pH, UA 5 5     Leukocytes, UA Negative     Nitrite, UA Negative     Protein, UA Trace (A) mg/dl      Glucose, UA Negative mg/dl      Ketones, UA Negative mg/dl      Urobilinogen, UA 0 2 E U /dl      Bilirubin, UA Negative     Blood, UA Negative     Specific Gravity, UA >=1 030    Narrative:       CLINITEK RESULT                 No orders to display              Procedures  Procedures       Phone Contacts  ED Phone Contact    ED Course  ED Course                                MDM  Number of Diagnoses or Management Options  13 weeks gestation of pregnancy: minor  Pain of round ligament during pregnancy: new and requires workup  Diagnosis management comments: 73-year-old female presents for evaluation lower abdominal/pelvic pain  She is approximately 13 weeks pregnant  There has been no vaginal bleeding or discharge  The patient has not taken anything for the pain  Discussed likely diagnosis of round ligament pain,  The urinalysis is without signs of infection  The plan is for the patient to begin Tylenol and follow up with the OBGYN office as outpatient  Bedside ultrasound performed demonstrating single live intrauterine pregnancy with a fetal heart rate of 158 beats per minute  The patient (and any family present) verbalized understanding of the discharge instructions and warnings that would necessitate return to the Emergency Department  All questions were answered prior to discharge           Amount and/or Complexity of Data Reviewed  Clinical lab tests: ordered and reviewed  Review and summarize past medical records: yes      CritCare Time    Disposition  Final diagnoses:   Pain of round ligament during pregnancy   13 weeks gestation of pregnancy     Time reflects when diagnosis was documented in both MDM as applicable and the Disposition within this note     Time User Action Codes Description Comment    3/6/2018 12:14 PM Michael Pitts Add [X62 522,  R10 2] Pain of round ligament during pregnancy     3/6/2018 12:14 PM Michael Pitts Modify [E86 968,  R10 2] Pain of round ligament during pregnancy     3/6/2018 12:14 PM Daksha Hickman Add [Z3A 13] 13 weeks gestation of pregnancy       ED Disposition     ED Disposition Condition Comment    Discharge  Finnvijay Del Riorera discharge to home/self care  Condition at discharge: Stable        Follow-up Information     Follow up With Specialties Details Why [facility]etric[facility]necology Schedule an appointment as soon as possible for a visit in 1 week For further evaluation, if not improved [place]  [id number] [phone]        Patient's Medications   Discharge Prescriptions    No medications on file     No discharge procedures on file      ED Provider  Electronically Signed by           Diya Nix DO  03/06/18 2146

## 2018-03-06 NOTE — DISCHARGE INSTRUCTIONS
Dolor abdominal aditya el embarazo   LO QUE NECESITA SABER:   El dolor abdominal aditya el embarazo es común  Algunas de las causas incluyen la [employer], estreñimiento, gases, falso labor de [place], y el dolor del ligamento yvette  El dolor del ligamento yvette es causado por el estiramiento de los ligamentos que sostienen el [place]  El dolor abdominal puede ser causado por un problema de lorri, lily virus estomacal o apendicitis (inflamación del apéndice)  El dolor puede ser provocado por un problema con harman [place], lily juan c amenaza de parto prematuro o aborto espontáneo  INSTRUCCIONES SOBRE EL LUISANA HOSPITALARIA:   Merlin juan c stoney de seguimiento con harman obstetra dentro de los nakia 3 días posteriores al luisana: Anote colin preguntas para que se acuerde de hacerlas aditya colin visitas  Cuidados personales:   · El reposo puede ayudar a aliviar el dolor del ligamento yvette  Consulte a harman médico acerca de otras maneras de [employer], lily usar un cinturón o figueroa de soporte o hacer ejercicios aptos para el embarazo  · Utilice juan c almohadilla térmica en la temperatura más baja o juan c compresa caliente para que se la coloque en harman abdomen  Merlin esto por 20 a 30 minutos cada 2 horas por tantos [employer] indiquen  · Evite cambios de posición repentinos o movimientos que causan dolor  · No se recueste en la cama ni se incline hacia brendan si tiene acidez estomacal  Pregúntele a harman ginecólogo si usted debería hacer cambios a harman alimentación  Pregunte si usted puede newton algún medicamento para la acidez estomacal      · Consuma alimentos con altos contenido de [place] y renata más líquidos para aliviar el estreñimiento  La fibra se encuentra en frutas, verduras, y alimentos de grano integral, lily el pan y el cereal integral  Pregunte cuánto líquido debe newton cada día y cuáles líquidos son los más adecuados para usted    Comuníquese con harman obstetra si:  · El dolor abdominal persiste y no se puede aliviar  · Usted tiene fiebre  · Usted tiene preguntas o inquietudes acerca de awad condición o cuidado  Regrese a la camron de emergencias si:   · Usted de repente tiene un intenso dolor o cólicos que son tan dayne que le impiden caminar o hablar  · Usted tiene un latido cardíaco rápido, le falta el aire y se siente mareado o que se va a desmayar  · Usted tiene sangrado o secreción vaginal      · Usted tiene náuseas, vomito, fiebre y un dolor intenso en el lado derecho de awad cuerpo  © 2017 [facility] Information is for End User's use only and may not be sold, redistributed or otherwise used for commercial purposes  All illustrations and images included in CareNotes® are the copyrighted property of A D A M , Inc  or Doug Ospina  Esta información es sólo para uso en educación  Awad intención no es darle un consejo médico sobre enfermedades o tratamientos  Colsulte con awad [place] farmacéutico antes de seguir cualquier régimen médico para saber si es seguro y efectivo para usted

## 2018-03-06 NOTE — ED NOTES
Provider at bedside; Jeyson Orona RN at bedside to translate at this time        Art Munson RN  03/06/18 9249

## 2018-03-08 ENCOUNTER — APPOINTMENT (OUTPATIENT)
Dept: LAB | Facility: HOSPITAL | Age: 33
End: 2018-03-08
Payer: COMMERCIAL

## 2018-03-08 DIAGNOSIS — Z3A.08 8 WEEKS GESTATION OF PREGNANCY: ICD-10-CM

## 2018-03-08 LAB
ABO GROUP BLD: NORMAL
BASOPHILS # BLD AUTO: 0.01 THOUSANDS/ΜL (ref 0–0.1)
BASOPHILS NFR BLD AUTO: 0 % (ref 0–1)
BLD GP AB SCN SERPL QL: NEGATIVE
EOSINOPHIL # BLD AUTO: 0.1 THOUSAND/ΜL (ref 0–0.61)
EOSINOPHIL NFR BLD AUTO: 1 % (ref 0–6)
ERYTHROCYTE [DISTWIDTH] IN BLOOD BY AUTOMATED COUNT: 13 % (ref 11.6–15.1)
HCT VFR BLD AUTO: 35.1 % (ref 34.8–46.1)
HGB BLD-MCNC: 12.6 G/DL (ref 11.5–15.4)
LYMPHOCYTES # BLD AUTO: 1.26 THOUSANDS/ΜL (ref 0.6–4.47)
LYMPHOCYTES NFR BLD AUTO: 17 % (ref 14–44)
MCH RBC QN AUTO: 31.8 PG (ref 26.8–34.3)
MCHC RBC AUTO-ENTMCNC: 35.9 G/DL (ref 31.4–37.4)
MCV RBC AUTO: 89 FL (ref 82–98)
MONOCYTES # BLD AUTO: 0.39 THOUSAND/ΜL (ref 0.17–1.22)
MONOCYTES NFR BLD AUTO: 5 % (ref 4–12)
NEUTROPHILS # BLD AUTO: 5.84 THOUSANDS/ΜL (ref 1.85–7.62)
NEUTS SEG NFR BLD AUTO: 77 % (ref 43–75)
NRBC BLD AUTO-RTO: 0 /100 WBCS
PLATELET # BLD AUTO: 220 THOUSANDS/UL (ref 149–390)
PMV BLD AUTO: 10.1 FL (ref 8.9–12.7)
RBC # BLD AUTO: 3.96 MILLION/UL (ref 3.81–5.12)
RH BLD: POSITIVE
RUBV IGG SERPL IA-ACNC: >175 IU/ML
SPECIMEN EXPIRATION DATE: NORMAL
WBC # BLD AUTO: 7.6 THOUSAND/UL (ref 4.31–10.16)

## 2018-03-08 PROCEDURE — 80081 OBSTETRIC PANEL INC HIV TSTG: CPT

## 2018-03-08 PROCEDURE — 86787 VARICELLA-ZOSTER ANTIBODY: CPT

## 2018-03-08 PROCEDURE — 87086 URINE CULTURE/COLONY COUNT: CPT

## 2018-03-08 PROCEDURE — 36415 COLL VENOUS BLD VENIPUNCTURE: CPT

## 2018-03-09 LAB
BACTERIA UR CULT: NORMAL
HBV SURFACE AG SER QL: NORMAL
HIV 1+2 AB+HIV1 P24 AG SERPL QL IA: NORMAL
RPR SER QL: NORMAL

## 2018-03-13 PROBLEM — O09.899 SUSCEPTIBLE TO VARICELLA (NON-IMMUNE), CURRENTLY PREGNANT: Status: ACTIVE | Noted: 2018-03-13

## 2018-03-13 PROBLEM — Z28.39 SUSCEPTIBLE TO VARICELLA (NON-IMMUNE), CURRENTLY PREGNANT: Status: ACTIVE | Noted: 2018-03-13

## 2018-03-13 LAB — VZV IGG SER IA-ACNC: ABNORMAL

## 2018-03-15 ENCOUNTER — ROUTINE PRENATAL (OUTPATIENT)
Dept: OBGYN CLINIC | Facility: CLINIC | Age: 33
End: 2018-03-15
Payer: COMMERCIAL

## 2018-03-15 VITALS
SYSTOLIC BLOOD PRESSURE: 116 MMHG | HEART RATE: 103 BPM | DIASTOLIC BLOOD PRESSURE: 80 MMHG | BODY MASS INDEX: 32.18 KG/M2 | HEIGHT: 67 IN | WEIGHT: 205 LBS

## 2018-03-15 DIAGNOSIS — Z34.92 PRENATAL CARE IN SECOND TRIMESTER: Primary | ICD-10-CM

## 2018-03-15 PROBLEM — Z3A.14 14 WEEKS GESTATION OF PREGNANCY: Status: ACTIVE | Noted: 2018-02-15

## 2018-03-15 LAB
SL AMB  POCT GLUCOSE, UA: NORMAL
SL AMB POCT ALBUMIN: NORMAL

## 2018-03-15 PROCEDURE — PNV: Performed by: NURSE PRACTITIONER

## 2018-03-15 NOTE — PATIENT INSTRUCTIONS
Call to make appointment for Level II U/S  To call with vaginal bleeding, loss of fluid, regular contractions,  other needs or concerns    Return in 4 weeks

## 2018-03-15 NOTE — PROGRESS NOTES
Assessment & Plan  29 y o   at 14w2d presenting for routine prenatal visit  Carol declines genetic testing, will have Level II U/S, referral provided  Explained lab work completed was WNL    Problem List Items Addressed This Visit     Prenatal care in second trimester - Primary    Relevant Orders    POCT urine dip (Completed)    Ambulatory Referral to Maternal Fetal Medicine      Plan  Call to make appointment for Level II U/S  To call with vaginal bleeding, loss of fluid, regular contractions,  other needs or concerns  Return in 4 weeks  Pt verbalized understanding of all discussed  ____________________________________________________________  Subjective  She is without complaint  She denies contractions, loss of fluid, or vaginal bleeding  She does notfeels regular fetal movements       Objective  /80   Pulse 103   Ht 5' 7" (1 702 m)   Wt 93 kg (205 lb)   LMP 2017 (Exact Date)   BMI 32 11 kg/m²   FHR: 150    Patient's Active Problem List  Patient Active Problem List   Diagnosis    Prenatal care in second trimester    14 weeks gestation of pregnancy    Susceptible to varicella (non-immune), currently pregnant

## 2018-04-12 ENCOUNTER — ROUTINE PRENATAL (OUTPATIENT)
Dept: OBGYN CLINIC | Facility: CLINIC | Age: 33
End: 2018-04-12
Payer: COMMERCIAL

## 2018-04-12 VITALS
BODY MASS INDEX: 32.96 KG/M2 | DIASTOLIC BLOOD PRESSURE: 73 MMHG | SYSTOLIC BLOOD PRESSURE: 109 MMHG | WEIGHT: 210 LBS | HEIGHT: 67 IN | HEART RATE: 102 BPM

## 2018-04-12 DIAGNOSIS — Z3A.18 18 WEEKS GESTATION OF PREGNANCY: ICD-10-CM

## 2018-04-12 DIAGNOSIS — Z34.92 PRENATAL CARE IN SECOND TRIMESTER: Primary | ICD-10-CM

## 2018-04-12 LAB
SL AMB  POCT GLUCOSE, UA: NORMAL
SL AMB POCT ALBUMIN: NORMAL

## 2018-04-12 PROCEDURE — PNV: Performed by: NURSE PRACTITIONER

## 2018-04-12 PROCEDURE — 81002 URINALYSIS NONAUTO W/O SCOPE: CPT | Performed by: NURSE PRACTITIONER

## 2018-04-12 NOTE — PATIENT INSTRUCTIONS
Patti Chaney [facility] appointment already scheduled  Call with vaginal bleeding, loss of fluid, regular contractions, other needs or concerns    Return in 4 weeks

## 2018-04-12 NOTE — PROGRESS NOTES
Assessment & Plan  29 y o   at 18w2d presenting for routine prenatal visit  Has Level Ii U/S scheduled  Feels well    Problem List Items Addressed This Visit     Prenatal care in second trimester - Primary    Relevant Orders    POCT urine dip (Completed)    18 weeks gestation of pregnancy      [facility] appointment already scheduled  Call with vaginal bleeding, loss of fluid, regular contractions, other needs or concerns  Return in 4 weeks  Pt verbalized understanding of all discussed  ____________________________________________________________  Subjective  She is without complaint  She denies contractions, loss of fluid, or vaginal bleeding  She feels regular fetal movements       Objective  /73   Pulse 102   Ht 5' 7" (1 702 m)   Wt 95 3 kg (210 lb)   LMP 2017 (Exact Date)   BMI 32 89 kg/m²   FHR: 150    Patient's Active Problem List  Patient Active Problem List   Diagnosis    Prenatal care in second trimester    18 weeks gestation of pregnancy    Susceptible to varicella (non-immune), currently pregnant

## 2018-04-26 ENCOUNTER — ROUTINE PRENATAL (OUTPATIENT)
Dept: PERINATAL CARE | Facility: CLINIC | Age: 33
End: 2018-04-26
Payer: COMMERCIAL

## 2018-04-26 VITALS
HEART RATE: 94 BPM | BODY MASS INDEX: 32.96 KG/M2 | SYSTOLIC BLOOD PRESSURE: 110 MMHG | HEIGHT: 67 IN | DIASTOLIC BLOOD PRESSURE: 74 MMHG | WEIGHT: 210 LBS

## 2018-04-26 DIAGNOSIS — Z34.92 PRENATAL CARE IN SECOND TRIMESTER: ICD-10-CM

## 2018-04-26 DIAGNOSIS — O44.42 LOW-LYING PLACENTA IN SECOND TRIMESTER: ICD-10-CM

## 2018-04-26 DIAGNOSIS — O99.212 OBESITY AFFECTING PREGNANCY IN SECOND TRIMESTER: Primary | ICD-10-CM

## 2018-04-26 DIAGNOSIS — Z36.86 ENCOUNTER FOR ANTENATAL SCREENING FOR CERVICAL LENGTH: ICD-10-CM

## 2018-04-26 DIAGNOSIS — Z3A.20 20 WEEKS GESTATION OF PREGNANCY: ICD-10-CM

## 2018-04-26 PROBLEM — Z3A.18 18 WEEKS GESTATION OF PREGNANCY: Status: RESOLVED | Noted: 2018-02-15 | Resolved: 2018-04-26

## 2018-04-26 PROBLEM — O99.211 OBESITY AFFECTING PREGNANCY IN FIRST TRIMESTER: Status: RESOLVED | Noted: 2018-04-26 | Resolved: 2018-04-26

## 2018-04-26 PROBLEM — O99.211 OBESITY AFFECTING PREGNANCY IN FIRST TRIMESTER: Status: ACTIVE | Noted: 2018-04-26

## 2018-04-26 PROCEDURE — 76817 TRANSVAGINAL US OBSTETRIC: CPT | Performed by: OBSTETRICS & GYNECOLOGY

## 2018-04-26 PROCEDURE — 76811 OB US DETAILED SNGL FETUS: CPT | Performed by: OBSTETRICS & GYNECOLOGY

## 2018-04-26 PROCEDURE — 99212 OFFICE O/P EST SF 10 MIN: CPT | Performed by: OBSTETRICS & GYNECOLOGY

## 2018-04-26 NOTE — PROGRESS NOTES
Please refer to the [facility] ultrasound report in Ob Procedures for additional information regarding the visit to the [facility]  today

## 2018-04-26 NOTE — LETTER
2018     1 [facility] PROVIDER    Patient: Edna Baker   YOB: 1985   Date of Visit: 2018       Dear   Provider: Thank you for referring Edna Baker to me for evaluation  Below are my notes for this consultation  If you have questions, please do not hesitate to call me  I look forward to following your patient along with you  Sincerely,         [address] [place]        CC: No Recipients  Turner Castaneda MD  2018  1:28 PM  Sign at close encounter  Please refer to the [facility] ultrasound report in Ob Procedures for additional information regarding the visit to the [facility]  today

## 2018-05-10 ENCOUNTER — ROUTINE PRENATAL (OUTPATIENT)
Dept: OBGYN CLINIC | Facility: CLINIC | Age: 33
End: 2018-05-10
Payer: COMMERCIAL

## 2018-05-10 VITALS
SYSTOLIC BLOOD PRESSURE: 114 MMHG | WEIGHT: 213.2 LBS | BODY MASS INDEX: 33.46 KG/M2 | DIASTOLIC BLOOD PRESSURE: 67 MMHG | HEART RATE: 82 BPM | HEIGHT: 67 IN

## 2018-05-10 DIAGNOSIS — Z34.92 PRENATAL CARE IN SECOND TRIMESTER: Primary | ICD-10-CM

## 2018-05-10 PROBLEM — Z3A.22 22 WEEKS GESTATION OF PREGNANCY: Status: ACTIVE | Noted: 2018-05-10

## 2018-05-10 LAB
SL AMB  POCT GLUCOSE, UA: NEGATIVE
SL AMB POCT ALBUMIN: NEGATIVE

## 2018-05-10 PROCEDURE — PNV: Performed by: OBSTETRICS & GYNECOLOGY

## 2018-05-10 NOTE — PROGRESS NOTES
Krunal Molina is a 29 y o   at 24w4d Kiswahili speaking patient    Chief complaint today: Some swelling in her legs bilaterally, ankles equal in size, pt denies SOB, chest pain, tenderness in the back of her legs  (There is mild swelling in her legs but not significant) No Hx of prior blood clot  ROS: VB: denies LOF: denies CXN: denies FM: present    Vitals:    05/10/18 1258   BP: 114/67   Pulse: 82       1) IUP at 22w2d   - Reviewed signs and symptoms of  labor  2) Level II US performed   - Suboptimal images due to unfavorable fetal position, otherwise no obvious abnormalities noted   - Pt will need f/u imaging per [address]  [facility]  3) Obesity (BMI 32 9)   - Recommended diet and exercise   - Encouraged walking after meals and limiting soda and sugar intake  4) Varicella non-immune   - Will need MMR vaccine postpartum  5) RTC in 4 weeks     - Pt will need 28week labs at next visit in preparation of 28w appt, pt is aware of this    Future Appointments  Date Time Provider Elizabeth De Souza   2018 1:00 PM [facility]   2018 1:00 PM  [address], DO  PGY-1 OB/GYN   5/10/2018 1:19 PM

## 2018-05-10 NOTE — PATIENT INSTRUCTIONS
Pregnancy at [address] to [address]:   Now that you are in your second trimester, you have more energy  You may also be feeling hungrier than usual  You may be gaining about ½ to 1 pound a week, and your pregnancy is beginning to show  You may need to start wearing maternity clothes  As your baby gets larger, you may have other symptoms  These may include body aches or stretch marks on your abdomen, breasts, thighs, or buttocks  DISCHARGE INSTRUCTIONS:   Seek care immediately if:   · You develop a severe headache that does not go away  · You have new or increased vision changes, such as blurred or spotted vision  · You have new or increased swelling in your face or hands  · You have vaginal spotting or bleeding  · Your water broke or you feel warm water gushing or trickling from your vagina  Contact your healthcare provider if:   · You have abdominal cramps, pressure, or tightening  · You have a change in vaginal discharge  · You cannot keep food or drinks down, and you are losing weight  · You have chills or a fever  · You have vaginal itching, burning, or pain  · You have yellow, green, white, or foul-smelling vaginal discharge  · You have pain or burning when you urinate, less urine than usual, or pink or bloody urine  · You have questions or concerns about your condition or care  How to care for yourself at this stage of your pregnancy:   · Eat a variety of healthy foods  Healthy foods include fruits, vegetables, whole-grain breads, low-fat dairy foods, beans, lean meats, and fish  Drink liquids as directed  Ask how much liquid to drink each day and which liquids are best for you  Limit caffeine to less than 200 milligrams each day  Limit your intake of fish to 2 servings each week  Choose fish low in mercury such as canned light tuna, shrimp, salmon, cod, or tilapia  Do not  eat fish high in mercury such as swordfish, tilefish, roscoe mackerel, and shark  · Take prenatal vitamins as directed  Your need for certain vitamins and minerals, such as folic acid, increases during pregnancy  Prenatal vitamins provide some of the extra vitamins and minerals you need  Prenatal vitamins may also help to decrease the risk of certain birth defects  · Talk to your healthcare provider about exercise  Moderate exercise can help you stay fit  Your healthcare provider will help you plan an exercise program that is safe for you during pregnancy  · Do not smoke  If you smoke, it is never too late to quit  Smoking increases your risk of a miscarriage and other health problems during your pregnancy  Smoking can cause your baby to be born too early or weigh less at birth  Ask your healthcare provider for information if you need help quitting  · Do not drink alcohol  Alcohol passes from your body to your baby through the placenta  It can affect your baby's brain development and cause fetal alcohol syndrome (FAS)  FAS is a group of conditions that causes mental, behavior, and growth problems  · Talk to your healthcare provider before you take any medicines  Many medicines may harm your baby if you take them when you are pregnant  Do not take any medicines, vitamins, herbs, or supplements without first talking to your healthcare provider  Never use illegal or street drugs (such as marijuana or cocaine) while you are pregnant  Safety tips during pregnancy:   · Avoid hot tubs and saunas  Do not use a hot tub or sauna while you are pregnant, especially during your first trimester  Hot tubs and saunas may raise your baby's temperature and increase the risk of birth defects  · Avoid toxoplasmosis  This is an infection caused by eating raw meat or being around infected cat feces  It can cause birth defects, miscarriages, and other problems  Wash your hands after you touch raw meat  Make sure any meat is well-cooked before you eat it   Avoid raw eggs and unpasteurized milk  Use gloves or ask someone else to clean your cat's litter box while you are pregnant  Changes that are happening with your baby:  By 22 weeks, your baby is about 8 inches long from the top of the head to the rump (baby's bottom)  Your baby also weighs about 1 pound  Your baby is becoming much more active  You may be able to feel the baby move inside you now  The first movements may not be that noticeable  They may feel like a fluttering sensation  As time goes on, your baby's movements will become stronger and more noticeable  What you need to know about prenatal care:  During the first 28 weeks of your pregnancy, you will see your healthcare provider once a month  Your healthcare provider will check your blood pressure and weight  You may also need the following:  · A urine test  may also be done to check for sugar and protein  These can be signs of gestational diabetes or infection  Protein in your urine may also be a sign of preeclampsia  Preeclampsia is a condition that can develop during week 20 or later of your pregnancy  It causes high blood pressure, and it can cause problems with your kidneys and other organs  · Fundal height  is a measurement of your uterus to check your baby's growth  This number is usually the same as the number of weeks that you have been pregnant  · A fetal ultrasound  shows pictures of your baby inside your uterus  It shows your baby's development  The movement and position of your baby can also be seen  Your healthcare provider may be able to tell you what your baby's gender is during the ultrasound  · Your baby's heart rate  will be checked  © 2017 [address] Information is for End User's use only and may not be sold, redistributed or otherwise used for commercial purposes  All illustrations and images included in CareNotes® are the copyrighted property of A [employer] A [employer]  or Doug Ospina    The above information is an  only  It is not intended as medical advice for individual conditions or treatments  Talk to your doctor, nurse or pharmacist before following any medical regimen to see if it is safe and effective for you

## 2018-06-07 ENCOUNTER — ROUTINE PRENATAL (OUTPATIENT)
Dept: OBGYN CLINIC | Facility: CLINIC | Age: 33
End: 2018-06-07
Payer: COMMERCIAL

## 2018-06-07 VITALS
WEIGHT: 213.2 LBS | DIASTOLIC BLOOD PRESSURE: 84 MMHG | BODY MASS INDEX: 33.39 KG/M2 | HEART RATE: 99 BPM | SYSTOLIC BLOOD PRESSURE: 128 MMHG

## 2018-06-07 DIAGNOSIS — N89.8 VAGINAL DISCHARGE DURING PREGNANCY IN SECOND TRIMESTER: ICD-10-CM

## 2018-06-07 DIAGNOSIS — O26.892 VAGINAL DISCHARGE DURING PREGNANCY IN SECOND TRIMESTER: ICD-10-CM

## 2018-06-07 DIAGNOSIS — Z3A.26 26 WEEKS GESTATION OF PREGNANCY: ICD-10-CM

## 2018-06-07 DIAGNOSIS — Z34.92 PRENATAL CARE IN SECOND TRIMESTER: Primary | ICD-10-CM

## 2018-06-07 LAB
SL AMB  POCT GLUCOSE, UA: NEGATIVE
SL AMB POCT ALBUMIN: NEGATIVE

## 2018-06-07 PROCEDURE — PNV: Performed by: OBSTETRICS & GYNECOLOGY

## 2018-06-07 PROCEDURE — 87210 SMEAR WET MOUNT SALINE/INK: CPT | Performed by: OBSTETRICS & GYNECOLOGY

## 2018-06-07 NOTE — PROGRESS NOTES
Pt is a 34yo  at 26w2d presents for routine prenatal visit  She denies vaginal bleeding, loss of fluid, ctx  Reports positive fetal movement  Pt reports that she has vaginal discharge that is white and occasionally yellow, but no odor, for the last few weeks  Pt also requests a note for work stating she is unable to operate heavy machinery and take breaks to elevate feet  She states the note that she received was too generic by her boss and they require a more specific note to allow her to be moved to a different part of the department that accomodates her work restrictions  Interview was conducted with  # [id number]       1) IUP at 26wk  Continue routine prenatal care  Work note provided with specific restrictions  28wk labs ordered and pt instructed to go to lab at earliest conveinence   Will need Tdap at next visit  Pre-term labor precautions reviewed and encouraged belly band and compression socks while at work  RTO in 2 weeks  2) Vaginal discharge  SSE: white discharge in vaginal vault  KOH/[place]: negative for yeast, clue cells, trichomonads  Pt reassured most likely leukorrhea, normal discharge of pregnancy    Gabriel Feliciano MD  OBGYN, PGY-1  2018 2:07 PM

## 2018-06-14 ENCOUNTER — APPOINTMENT (OUTPATIENT)
Dept: LAB | Facility: HOSPITAL | Age: 33
End: 2018-06-14
Payer: COMMERCIAL

## 2018-06-14 LAB
BASOPHILS # BLD AUTO: 0.01 THOUSANDS/ΜL (ref 0–0.1)
BASOPHILS NFR BLD AUTO: 0 % (ref 0–1)
EOSINOPHIL # BLD AUTO: 0.06 THOUSAND/ΜL (ref 0–0.61)
EOSINOPHIL NFR BLD AUTO: 1 % (ref 0–6)
ERYTHROCYTE [DISTWIDTH] IN BLOOD BY AUTOMATED COUNT: 13.2 % (ref 11.6–15.1)
GLUCOSE 1H P 50 G GLC PO SERPL-MCNC: 120 MG/DL
HCT VFR BLD AUTO: 34.6 % (ref 34.8–46.1)
HGB BLD-MCNC: 11.9 G/DL (ref 11.5–15.4)
LYMPHOCYTES # BLD AUTO: 1.04 THOUSANDS/ΜL (ref 0.6–4.47)
LYMPHOCYTES NFR BLD AUTO: 12 % (ref 14–44)
MCH RBC QN AUTO: 31.7 PG (ref 26.8–34.3)
MCHC RBC AUTO-ENTMCNC: 34.4 G/DL (ref 31.4–37.4)
MCV RBC AUTO: 92 FL (ref 82–98)
MONOCYTES # BLD AUTO: 0.42 THOUSAND/ΜL (ref 0.17–1.22)
MONOCYTES NFR BLD AUTO: 5 % (ref 4–12)
NEUTROPHILS # BLD AUTO: 7.23 THOUSANDS/ΜL (ref 1.85–7.62)
NEUTS SEG NFR BLD AUTO: 83 % (ref 43–75)
NRBC BLD AUTO-RTO: 0 /100 WBCS
PLATELET # BLD AUTO: 194 THOUSANDS/UL (ref 149–390)
PMV BLD AUTO: 10.1 FL (ref 8.9–12.7)
RBC # BLD AUTO: 3.75 MILLION/UL (ref 3.81–5.12)
WBC # BLD AUTO: 8.76 THOUSAND/UL (ref 4.31–10.16)

## 2018-06-14 PROCEDURE — 85025 COMPLETE CBC W/AUTO DIFF WBC: CPT | Performed by: OBSTETRICS & GYNECOLOGY

## 2018-06-14 PROCEDURE — 36415 COLL VENOUS BLD VENIPUNCTURE: CPT | Performed by: OBSTETRICS & GYNECOLOGY

## 2018-06-14 PROCEDURE — 82950 GLUCOSE TEST: CPT | Performed by: OBSTETRICS & GYNECOLOGY

## 2018-06-14 PROCEDURE — 86592 SYPHILIS TEST NON-TREP QUAL: CPT | Performed by: OBSTETRICS & GYNECOLOGY

## 2018-06-15 LAB — RPR SER QL: NORMAL

## 2018-06-21 ENCOUNTER — ROUTINE PRENATAL (OUTPATIENT)
Dept: OBGYN CLINIC | Facility: CLINIC | Age: 33
End: 2018-06-21
Payer: COMMERCIAL

## 2018-06-21 ENCOUNTER — ULTRASOUND (OUTPATIENT)
Dept: PERINATAL CARE | Facility: CLINIC | Age: 33
End: 2018-06-21
Payer: COMMERCIAL

## 2018-06-21 VITALS
HEART RATE: 101 BPM | DIASTOLIC BLOOD PRESSURE: 74 MMHG | WEIGHT: 219.6 LBS | SYSTOLIC BLOOD PRESSURE: 118 MMHG | BODY MASS INDEX: 34.47 KG/M2 | HEIGHT: 67 IN

## 2018-06-21 VITALS
DIASTOLIC BLOOD PRESSURE: 71 MMHG | SYSTOLIC BLOOD PRESSURE: 105 MMHG | WEIGHT: 217 LBS | BODY MASS INDEX: 34.06 KG/M2 | HEIGHT: 67 IN | HEART RATE: 90 BPM

## 2018-06-21 DIAGNOSIS — Z3A.28 28 WEEKS GESTATION OF PREGNANCY: ICD-10-CM

## 2018-06-21 DIAGNOSIS — Z30.2 REQUEST FOR STERILIZATION: ICD-10-CM

## 2018-06-21 DIAGNOSIS — Z03.72 PLACENTAL PROBLEM SUSPECTED BUT NOT FOUND: ICD-10-CM

## 2018-06-21 DIAGNOSIS — A60.00 HERPES SIMPLEX INFECTION OF GENITOURINARY SYSTEM: ICD-10-CM

## 2018-06-21 DIAGNOSIS — Z28.39 SUSCEPTIBLE TO VARICELLA (NON-IMMUNE), CURRENTLY PREGNANT: ICD-10-CM

## 2018-06-21 DIAGNOSIS — O09.899 SUSCEPTIBLE TO VARICELLA (NON-IMMUNE), CURRENTLY PREGNANT: ICD-10-CM

## 2018-06-21 DIAGNOSIS — Z23 IMMUNIZATION DUE: ICD-10-CM

## 2018-06-21 DIAGNOSIS — Z34.93 PRENATAL CARE IN THIRD TRIMESTER: Primary | ICD-10-CM

## 2018-06-21 DIAGNOSIS — O99.213 OBESITY AFFECTING PREGNANCY IN THIRD TRIMESTER: Primary | ICD-10-CM

## 2018-06-21 PROBLEM — O44.42 LOW-LYING PLACENTA IN SECOND TRIMESTER: Status: RESOLVED | Noted: 2018-04-26 | Resolved: 2018-06-21

## 2018-06-21 LAB
SL AMB  POCT GLUCOSE, UA: NORMAL
SL AMB POCT ALBUMIN: NORMAL

## 2018-06-21 PROCEDURE — 90471 IMMUNIZATION ADMIN: CPT | Performed by: OBSTETRICS & GYNECOLOGY

## 2018-06-21 PROCEDURE — 76817 TRANSVAGINAL US OBSTETRIC: CPT | Performed by: OBSTETRICS & GYNECOLOGY

## 2018-06-21 PROCEDURE — 81002 URINALYSIS NONAUTO W/O SCOPE: CPT | Performed by: OBSTETRICS & GYNECOLOGY

## 2018-06-21 PROCEDURE — 99212 OFFICE O/P EST SF 10 MIN: CPT | Performed by: OBSTETRICS & GYNECOLOGY

## 2018-06-21 PROCEDURE — PNV: Performed by: OBSTETRICS & GYNECOLOGY

## 2018-06-21 PROCEDURE — 76816 OB US FOLLOW-UP PER FETUS: CPT | Performed by: OBSTETRICS & GYNECOLOGY

## 2018-06-21 PROCEDURE — 90715 TDAP VACCINE 7 YRS/> IM: CPT | Performed by: OBSTETRICS & GYNECOLOGY

## 2018-06-21 NOTE — PROGRESS NOTES
A transvaginal ultrasound was performed  Sonographer note on use of High Level Disinfection Process (Trophon) for transvaginal probe# 1 used, serial [id number]    [place], [address]

## 2018-06-21 NOTE — PROGRESS NOTES
Assessment  29 y o   at 28w2d presenting for routine prenatal visit  Plan  Diagnoses and all orders for this visit:    Prenatal care in third trimester (28wks)  -     Routine prenatal care  -     Tdap given today  -     Return to office in 2wks for prenatal    Request for sterilization        -      [id number] signed at this visit        -      Counseled on  vs post-partum tubal, as well as the availability of LARCs    Susceptible to varicella (non-immune), currently pregnant        -       Needs vaccination postpartum    Herpes simplex infection of genitourinary system        -       Needs suppressive therapy at 36wks      Translation by Dr Adeel Awan  ____________________________________________________________        Suzi Littlejohn is a 29 y o   at 34w4d who presents for routine prenatal visit  She is without complaint  She denies contractions, loss of fluid, or vaginal bleeding  She feels regular fetal movements  Patient expressed desire for permanent sterilization  She was counseled today on tubal vs salpingectomy, when this can be completed ( vs 6wks post-partum), availability of LARCs and that this is considered a permanent procedure  She expresses difficulty taking time off of work and would like this done at exactly 6wks post-partum to prevent needing additional leave   Stressed importance of compliance with post-partum visit to facilitate scheduling of this request      Pregnancy Problems:  Patient Active Problem List   Diagnosis    Susceptible to varicella (non-immune), currently pregnant    Obesity affecting pregnancy in second trimester    Genital herpes simplex    Obesity    28 weeks gestation of pregnancy         Objective  /71   Pulse 90   Ht 5' 7" (1 702 m)   Wt 98 4 kg (217 lb)   LMP 2017 (Exact Date)   BMI 33 99 kg/m²     FHT: 148 BPM   Uterine Size: 28 cm     Physical Exam:  Physical Exam   Constitutional: She appears well-developed and well-nourished  No distress  HENT:   Head: Normocephalic and atraumatic  Eyes: No scleral icterus  Pulmonary/Chest: Effort normal  No accessory muscle usage  No respiratory distress  Abdominal: She exhibits distension (gravid)  There is no tenderness  There is no rebound and no guarding  Skin: Skin is warm and dry  No rash noted  No erythema  Psychiatric: She has a normal mood and affect   Her behavior is normal

## 2018-07-05 ENCOUNTER — ROUTINE PRENATAL (OUTPATIENT)
Dept: OBGYN CLINIC | Facility: CLINIC | Age: 33
End: 2018-07-05
Payer: COMMERCIAL

## 2018-07-05 VITALS
DIASTOLIC BLOOD PRESSURE: 70 MMHG | SYSTOLIC BLOOD PRESSURE: 105 MMHG | BODY MASS INDEX: 34.21 KG/M2 | WEIGHT: 218 LBS | HEIGHT: 67 IN | HEART RATE: 93 BPM

## 2018-07-05 DIAGNOSIS — Z34.93 PRENATAL CARE IN THIRD TRIMESTER: Primary | ICD-10-CM

## 2018-07-05 DIAGNOSIS — Z3A.30 30 WEEKS GESTATION OF PREGNANCY: ICD-10-CM

## 2018-07-05 LAB
SL AMB  POCT GLUCOSE, UA: NORMAL
SL AMB POCT URINE PROTEIN: NORMAL

## 2018-07-05 PROCEDURE — PNV: Performed by: NURSE PRACTITIONER

## 2018-07-05 PROCEDURE — 81002 URINALYSIS NONAUTO W/O SCOPE: CPT | Performed by: NURSE PRACTITIONER

## 2018-07-05 NOTE — PATIENT INSTRUCTIONS
Call with vaginal bleeding, loss of fluid, regular contractions, decreased fetal movement, other needs or concerns    Return in 2 weeks

## 2018-07-05 NOTE — PROGRESS NOTES
Assessment & Plan  29 y o   at 30w2d presenting for routine prenatal visit  Problem List Items Addressed This Visit        Other    30 weeks gestation of pregnancy    Relevant Orders    POCT urine dip (Completed)    Prenatal care in third trimester - Primary        ____________________________________________________________  Subjective  She is without complaint  She denies contractions, loss of fluid, or vaginal bleeding  She feels regular fetal movements  Objective  /70   Pulse 93   Ht 5' 7" (1 702 m)   Wt 98 9 kg (218 lb)   LMP 2017 (Exact Date)   BMI 34 14 kg/m²   FHR: 160    Patient's Active Problem List  Patient Active Problem List   Diagnosis    Susceptible to varicella (non-immune), currently pregnant    Obesity affecting pregnancy in second trimester    Genital herpes simplex    Obesity    30 weeks gestation of pregnancy    Request for sterilization    Prenatal care in third trimester     Plan  Call with vaginal bleeding, loss of fluid, regular contractions, decreased fetal movement, other needs or concerns  Return in 2 weeks  Pt verbalized understanding of all discussed

## 2018-07-20 ENCOUNTER — ROUTINE PRENATAL (OUTPATIENT)
Dept: OBGYN CLINIC | Facility: CLINIC | Age: 33
End: 2018-07-20
Payer: COMMERCIAL

## 2018-07-20 VITALS — BODY MASS INDEX: 34.93 KG/M2 | WEIGHT: 223 LBS | DIASTOLIC BLOOD PRESSURE: 71 MMHG | SYSTOLIC BLOOD PRESSURE: 108 MMHG

## 2018-07-20 DIAGNOSIS — Z3A.32 32 WEEKS GESTATION OF PREGNANCY: ICD-10-CM

## 2018-07-20 DIAGNOSIS — Z34.93 PRENATAL CARE IN THIRD TRIMESTER: Primary | ICD-10-CM

## 2018-07-20 PROCEDURE — PNV: Performed by: NURSE PRACTITIONER

## 2018-07-20 NOTE — PROGRESS NOTES
Assessment & Plan  29 y o   at [address] presenting for routine prenatal visit  Feeling well    Problem List Items Addressed This Visit        Other    32 weeks gestation of pregnancy    Prenatal care in third trimester - Primary        ____________________________________________________________  Subjective  She is without complaint  She denies contractions, loss of fluid, or vaginal bleeding  She feels regular fetal movements  Objective  /71   Wt 101 kg (223 lb)   LMP 2017 (Exact Date)   BMI 34 93 kg/m²   FHR: 140    Patient's Active Problem List  Patient Active Problem List   Diagnosis    Susceptible to varicella (non-immune), currently pregnant    Obesity affecting pregnancy in second trimester    Genital herpes simplex    Obesity    32 weeks gestation of pregnancy    Request for sterilization    Prenatal care in third trimester     Plan  Call with vaginal bleeding, loss of fluid, regular contractions, decreased fetal movement, other needs or concerns  Return in 2 weeks  Pt verbalized understanding of all discussed

## 2018-08-02 ENCOUNTER — ROUTINE PRENATAL (OUTPATIENT)
Dept: OBGYN CLINIC | Facility: CLINIC | Age: 33
End: 2018-08-02

## 2018-08-02 VITALS — SYSTOLIC BLOOD PRESSURE: 102 MMHG | WEIGHT: 224 LBS | BODY MASS INDEX: 35.08 KG/M2 | DIASTOLIC BLOOD PRESSURE: 68 MMHG

## 2018-08-02 DIAGNOSIS — Z3A.34 34 WEEKS GESTATION OF PREGNANCY: ICD-10-CM

## 2018-08-02 DIAGNOSIS — Z34.93 PRENATAL CARE IN THIRD TRIMESTER: Primary | ICD-10-CM

## 2018-08-02 PROCEDURE — PNV: Performed by: NURSE PRACTITIONER

## 2018-08-02 NOTE — PROGRESS NOTES
Assessment & Plan  29 y o   at 34w2d presenting for routine prenatal visit  Pt is to start valtrex next vist  GBS at next visit  Problem List Items Addressed This Visit        Other    34 weeks gestation of pregnancy    Prenatal care in third trimester - Primary        ____________________________________________________________  Subjective  She is without complaint  She denies contractions, loss of fluid, or vaginal bleeding  She feels regular fetal movements  Objective  /68   Wt 102 kg (224 lb)   LMP 2017 (Exact Date)   BMI 35 08 kg/m²   FHR: 150    Patient's Active Problem List  Patient Active Problem List   Diagnosis    Susceptible to varicella (non-immune), currently pregnant    Obesity affecting pregnancy in second trimester    Genital herpes simplex    Obesity    34 weeks gestation of pregnancy    Request for sterilization    Prenatal care in third trimester     Plan  Call with vaginal bleeding, loss of fluid, regular contractions, decreased fetal movement, other needs or concerns  Return in 2 weeks  Pt verbalized understanding of all discussed

## 2018-08-16 ENCOUNTER — ROUTINE PRENATAL (OUTPATIENT)
Dept: OBGYN CLINIC | Facility: CLINIC | Age: 33
End: 2018-08-16

## 2018-08-16 VITALS — DIASTOLIC BLOOD PRESSURE: 68 MMHG | WEIGHT: 229.4 LBS | SYSTOLIC BLOOD PRESSURE: 101 MMHG | BODY MASS INDEX: 35.93 KG/M2

## 2018-08-16 DIAGNOSIS — A60.00 GENITAL HERPES SIMPLEX, UNSPECIFIED SITE: ICD-10-CM

## 2018-08-16 DIAGNOSIS — Z3A.36 36 WEEKS GESTATION OF PREGNANCY: Primary | ICD-10-CM

## 2018-08-16 PROCEDURE — 87653 STREP B DNA AMP PROBE: CPT | Performed by: OBSTETRICS & GYNECOLOGY

## 2018-08-16 PROCEDURE — PNV: Performed by: OBSTETRICS & GYNECOLOGY

## 2018-08-16 RX ORDER — VALACYCLOVIR HYDROCHLORIDE 500 MG/1
500 TABLET, FILM COATED ORAL DAILY
Qty: 42 TABLET | Refills: 0 | Status: SHIPPED | OUTPATIENT
Start: 2018-08-16 | End: 2018-09-17 | Stop reason: HOSPADM

## 2018-08-16 NOTE — PROGRESS NOTES
Genital herpes simplex  Rx Valtrex 500 mg q day for suppression    36 weeks gestation of pregnancy  GBS collected   labor precautions reviewed  Fetal kick counts     Request for sterilization  MA 31 signed 18    Carlos Eddy DO    30 yo  @ 36w2d for routine prenatal care  Without complaints      Vitals:    18 1615   BP: 101/68     Fundal Height: 36 cm  FHT: 120

## 2018-08-16 NOTE — PATIENT INSTRUCTIONS
El embarazo de la semana 35 a la 38   LO QUE NECESITA SABER:   ¿Qué cambios están ocurriendo en mi cuerpo? Al principio de las 37 semanas se considera que usted está en término completo  Podría ser mas fácil que usted respire si harman bebé se ha posicionado con la sd hacia abajo  Es posible que usted necesite orinar con mayor frecuencia ya que el bebé podría estar presionando harman vejiga  Usted también podría sentir más molestias y cansarse fácilmente  ¿Cómo me cherelle cuidar en esta etapa de mi embarazo? · Consuma alimentos saludables y variados  Alimentos saludables incluyen frutas, verduras, panes de yolette integral, alimentos lácteos bajos en grasa, frijoles, kehinde magras y pescado  [place] líquidos lily se le haya indicado  Pregunte cuánto líquido debe newton cada día y cuáles líquidos son los más adecuados para usted  Limite el consumo de cafeína a menos de [address]  Limite el consumo de pescado a 2 porciones cada semana  Escoja pescado con concentraciones bajas de dottie lily atún al natural enlatado, camarón, salmón, bacalao o tilapia  No  coma pescado con concentraciones altas de dottie lily pez jasmina, caballa gigante, pargo rayado y tiburón  · [address]  Harman necesidad de ciertas vitaminas y [facility], lily el ácido fólico, aumenta aditya el [facility]  Las vitaminas prenatales proporcionan algunas de las vitaminas y minerales adicionales que usted necesita  Las vitaminas prenatales también podrían ayudar a disminuir el riesgo de ciertos defectos de nacimiento  · Descanse tanto lily sea necesario  Levante colin pies si tiene inflamación en colin tobillos y pies  · No fume  Si usted fuma, nunca es demasiado tarde para dejar de hacerlo  Fumar aumenta el riesgo de aborto espontáneo y otros problemas de lorri aditya harman [place]  Fumar puede causar que harman bebé nazca antes de tiempo o que pese menos al nacer   Solicite información a harman médico si usted necesita ayuda para dejar de fumar  · No consuma alcohol  El alcohol pasa de harman cuerpo al bebé a través de la placenta  Puede afectar el desarrollo del cerebro de harman bebé y provocar el síndrome de alcoholismo fetal (SAF)  FAS es un malena de condiciones que causan [address], de comportamiento y de crecimiento  · Consulte con harman médico antes de newton cualquier medicamento  Muchos medicamentos pueden perjudicar a harman bebé si usted los saida [address]  No tome ningún medicamento, vitaminas, hierbas o suplementos sin idris consultar con harman [place] [employer]using  use drogas ilegales o de la guzman (lily marihuana o cocaína) mientras está embarazada  · Hable con harman médico antes de viajar  Es posible que no pueda viajar en avión después de las 36 [address]  También le puede recomendar que evite largos viajes por carretera  ¿Cuáles son [employer] consejos de seguridad aditya el embarazo? · Evite jacuzzis y saunas  No use un jacuzzi o un sauna mientras usted está embarazada, especialmente aditya el primer trimestre  [facility] y los saunas aumentan la temperatura de harman bebé y el riesgo de defectos de nacimiento  · Evite la toxoplasmosis  [place] es juan c infección causada por comer carne cruda o estar cerca del excremento de un paz infectado  [place] puede causar malformaciones congénitas, aborto espontáneo y Ciro Schein  Lávese las dilcia después de tocar carne cruda  Asegúrese de que la carne esté audrey cocida antes de comerla  Evite los huevos crudos y la [place]  Use guantes o pida que alguien la ayude a limpiar la caja de arena del paz mientras usted Arabella Ling  · Consulte con harman médico acerca de viajar  El [address] cómodo para viajar es aditya el keith trimestre  Pregunte a harman médico si jelly puede viajar después de las 36 semanas  Es posible que no pueda viajar en avión después de las 36 [facility]   También le puede recomendar que evite largos viajes por carretera  ¿Qué cambios están ocurriendo con mi bebé? Para las 38 semanas, harman bebé podría pesar entre 6 y [address]  Harman bebé podría medir alrededor de 14 pulgadas de lisette desde la punta de la sd hasta la rabadilla (parte inferior del bebé)  Harman bebé escucha lo suficientemente audrey lily para reconocer harman voz  Conforme harman bebé crece más, usted podría sentir menos patadas y sentir más que harman bebé se estira y [place]  Harman bebé podría moverse en posición con la sd hacia abajo  Harman bebé también descansará en la parte inferior de harman abdomen  ¿Qué necesito saber acerca del cuidado prenatal?  Harman médico le revisará harman presión arterial y [place]  Es posible que también necesite lo siguiente:  · Un examen de orina  también podría realizarse para revisarle el azúcar y la proteína  Estas son señales de diabetes gestacional o de infección  La proteína en harman orina también podría ser juan c señal de preeclampsia  La preeclampsia es juan c condición que puede desarrollarse aditya la semana 21 o después en harman embarazo  Esta provoca presión arterial cam y [employer] con colin riñones y [place]  · Los análisis de wayne  se pueden realizar para revisar si tiene signos de anemia (nivel bajo del madelyn)  · La vacuna Tdap  podría ser recomendado por harman médico      · El examen del estreptococo del malena B  es un examen que se realiza para verificar si hay infección por estreptococos del malena B  El estreptococo del malena B es un tipo de bacteria que se puede encontrar en la vagina o el recto  Podría ser transmitida a harman bebé aditya el parto si usted la tiene  Harman médico podría newton Elihu Sriram de harman vagina o recto y bill la [place] Corey al laboratorio para que la examinen  · La altura uterina  es juan c medición del útero para controlar el desarrollo de harman bebé  [employer] por lo general es igual al número de [facility] que usted tiene de embarazo  Harman médico también podría revisar la posición de harman bebé       · El ritmo cardíaco de cullen bebé  será revisado  ¿Cuándo cherelle buscar atención inmediata? · Usted presenta un lindy dolor de sd que no desaparece  · Usted tiene cambios en la visión nuevos o en aumento, lily visión borrosa o con manchas  · Usted tiene inflamación nueva o creciente en cullen peewee o dilcia  · Usted tiene manchado o sangrado vaginal     · Usted rompe farhan o siente un chorro o gotas de agua tibia que le está bajando por cullen vagina  ¿Cuándo cherelle comunicarme con mi médico?   · Usted tiene más de 5 contracciones en 1 hora  · Usted nota algún cambio en los movimientos de cullen bebé  · Usted tiene calambres, presión o tensión abdominal     · Usted tiene un cambio en la secreción vaginal     · Usted tiene escalofríos o fiebre  · Usted tiene comezón, ardor o dolor vaginal      · Usted tiene juan c secreción vaginal amarillenta, verdosa, lazaro o de [employer]  · Usted tiene dolor o ardor al [place], orina menos de lo habitual o tiene [place] rosada o sanguinolenta  · Usted tiene preguntas o inquietudes acerca de cullen condición o cuidado  ACUERDOS SOBRE CULLEN CUIDADO:   Usted tiene el derecho de ayudar a planear cullen cuidado  Aprenda todo lo que pueda sobre cullen condición y lily darle tratamiento  Discuta colin opciones de tratamiento con colin médicos para decidir el cuidado que usted desea recibir  Usted siempre tiene el derecho de rechazar el tratamiento  Esta información es sólo para uso en educación  Cullen intención no es darle un consejo médico sobre enfermedades o tratamientos  Colsulte con cullen Alben Delia farmacéutico antes de seguir cualquier régimen médico para saber si es seguro y efectivo para usted  © 2017 [address] Information is for End User's use only and may not be sold, redistributed or otherwise used for commercial purposes  All illustrations and images included in CareNotes® are the copyrighted property of A D A M , Inc  or Doug Ospina

## 2018-08-19 LAB — GP B STREP DNA SPEC QL NAA+PROBE: ABNORMAL

## 2018-08-20 PROBLEM — B95.1 GROUP BETA STREP POSITIVE: Status: ACTIVE | Noted: 2018-08-20

## 2018-08-23 ENCOUNTER — ROUTINE PRENATAL (OUTPATIENT)
Dept: OBGYN CLINIC | Facility: CLINIC | Age: 33
End: 2018-08-23
Payer: COMMERCIAL

## 2018-08-23 VITALS — BODY MASS INDEX: 35.77 KG/M2 | DIASTOLIC BLOOD PRESSURE: 79 MMHG | SYSTOLIC BLOOD PRESSURE: 122 MMHG | WEIGHT: 228.4 LBS

## 2018-08-23 DIAGNOSIS — Z3A.36 36 WEEKS GESTATION OF PREGNANCY: ICD-10-CM

## 2018-08-23 PROCEDURE — PNV: Performed by: OBSTETRICS & GYNECOLOGY

## 2018-08-23 NOTE — PROGRESS NOTES
OB/GYN  PN Visit  Melissa Rolle  [id number]  2018  5:00 PM  Dr Sid Nelson MD    S: 29 y o   37w2d here for PN visit  She decline vaginal bleeding and fluid leakage  Reporting good fetal movement  She is today 37w3d  Result in terms of GBS culture shraed with patient which was positive    OB complaints:  Ctxns: No  Leakage: No  Bleeding: No  Fetal movement: Good    She reports none  O:  Vitals:    18 1557   BP: 122/79       Gen: no acute distress, nonlabored breathing  OB exam completed: fundal height 37     A/P:  #1  37w2d GESTATION  Labor precautions reviewed  Fetal kick counts reviewed  RTC in 1 weeks    #2 GBS test positive     #3  Continue routine prenatal care and prenatal vitamins    #4   Discussed with patinet precuation about labor, answered patinet questions and concerns    Future Appointments  Date Time Provider Elizabeth Nolvia   2018 4:30 PM Aubrie Choi MD [facility]         16-18 weeks: sequential screening, level II ultrasound order, flu vaccine  26-28 weeks: 28 week labs (CBC, RPR, 1hr GTT), Rh status/rhogam, FKC, flu vaccine  28-32 weeks: tdap, flu vaccine  32 weeks: tdap, flu vaccine, check in card, rediscuss contraception, birth plan  36 weeks: collect GBS/PCN allergy?, flu vaccine    Sid Nelson MD  [id number]  6:35 PM

## 2018-08-29 PROBLEM — Z3A.38 38 WEEKS GESTATION OF PREGNANCY: Status: ACTIVE | Noted: 2018-05-10

## 2018-08-30 ENCOUNTER — ROUTINE PRENATAL (OUTPATIENT)
Dept: OBGYN CLINIC | Facility: CLINIC | Age: 33
End: 2018-08-30
Payer: COMMERCIAL

## 2018-08-30 VITALS
SYSTOLIC BLOOD PRESSURE: 115 MMHG | WEIGHT: 233 LBS | HEART RATE: 88 BPM | DIASTOLIC BLOOD PRESSURE: 74 MMHG | BODY MASS INDEX: 36.49 KG/M2

## 2018-08-30 DIAGNOSIS — Z34.93 PRENATAL CARE IN THIRD TRIMESTER: ICD-10-CM

## 2018-08-30 DIAGNOSIS — Z3A.38 38 WEEKS GESTATION OF PREGNANCY: Primary | ICD-10-CM

## 2018-08-30 LAB
SL AMB  POCT GLUCOSE, UA: NEGATIVE
SL AMB POCT ALBUMIN: NEGATIVE

## 2018-08-30 PROCEDURE — 81002 URINALYSIS NONAUTO W/O SCOPE: CPT | Performed by: OBSTETRICS & GYNECOLOGY

## 2018-08-30 PROCEDURE — PNV: Performed by: OBSTETRICS & GYNECOLOGY

## 2018-09-06 ENCOUNTER — ROUTINE PRENATAL (OUTPATIENT)
Dept: OBGYN CLINIC | Facility: CLINIC | Age: 33
End: 2018-09-06
Payer: COMMERCIAL

## 2018-09-06 VITALS
HEART RATE: 80 BPM | HEIGHT: 67 IN | BODY MASS INDEX: 36.1 KG/M2 | SYSTOLIC BLOOD PRESSURE: 123 MMHG | DIASTOLIC BLOOD PRESSURE: 80 MMHG | WEIGHT: 230 LBS

## 2018-09-06 DIAGNOSIS — Z3A.39 39 WEEKS GESTATION OF PREGNANCY: Primary | ICD-10-CM

## 2018-09-06 PROCEDURE — PNV: Performed by: OBSTETRICS & GYNECOLOGY

## 2018-09-06 NOTE — PROGRESS NOTES
OB/GYN  PN Visit  Jamaal Oviedo  [id number]  2018  5:28 PM  Dr Linda Worrell MD    S: 29 y o   39w2d here for PN visit  OB complaints:  Ctxns: Reports joana nguyen contractions  Leakage: Denies  Bleeding: Denies  Fetal movement: Reports good fetal movement, meeting kick counts  She reports she is doing well and is feeling more pelvic pressure, requesting cervical check  She declines membrane stripping today  She says she has been taking her Valtrex daily  O:  Vitals:    18 1631   BP: 123/80   Pulse: 80       Gen: no acute distress, nonlabored breathing, pleasant  OB exam completed: fundal height 39cm, FHTs 135  SVE: 2/70/-3, VTX    A/P:  #1  39w2d GESTATION  GBS positive  Labor precautions and how to get a hold of OB on call reviewed  Fetal kick counts reviewed  RTC in 1 week    #2   H/o HSV   Continue Valtrex suppression until delivery    Future Appointments  Date Time Provider Elizabeth De Souza   2018 1:15 PM [facility]Miriam Worrell MD  2018  5:28 PM

## 2018-09-13 ENCOUNTER — ROUTINE PRENATAL (OUTPATIENT)
Dept: OBGYN CLINIC | Facility: CLINIC | Age: 33
End: 2018-09-13
Payer: COMMERCIAL

## 2018-09-13 VITALS
DIASTOLIC BLOOD PRESSURE: 83 MMHG | HEART RATE: 98 BPM | HEIGHT: 67 IN | WEIGHT: 228.6 LBS | SYSTOLIC BLOOD PRESSURE: 125 MMHG | BODY MASS INDEX: 35.88 KG/M2

## 2018-09-13 DIAGNOSIS — Z34.93 PRENATAL CARE IN THIRD TRIMESTER: ICD-10-CM

## 2018-09-13 DIAGNOSIS — Z3A.39 39 WEEKS GESTATION OF PREGNANCY: ICD-10-CM

## 2018-09-13 DIAGNOSIS — Z3A.40 40 WEEKS GESTATION OF PREGNANCY: Primary | ICD-10-CM

## 2018-09-13 LAB
SL AMB  POCT GLUCOSE, UA: NEGATIVE
SL AMB POCT URINE PROTEIN: NEGATIVE

## 2018-09-13 PROCEDURE — 81002 URINALYSIS NONAUTO W/O SCOPE: CPT | Performed by: NURSE PRACTITIONER

## 2018-09-13 PROCEDURE — PNV: Performed by: NURSE PRACTITIONER

## 2018-09-13 NOTE — PROGRESS NOTES
Assessment & Plan  29 y o   at 40w2d presenting for routine prenatal visit  Explained induction of labor is scheduled for Saturday 9/15/2016 0800  Problem List Items Addressed This Visit        Other    40 weeks gestation of pregnancy - Primary    Relevant Orders    POCT urine dip (Completed)    Prenatal care in third trimester    Relevant Orders    POCT urine dip (Completed)        ____________________________________________________________  Subjective  She is without complaint  She denies contractions, loss of fluid, or vaginal bleeding  She feels regular fetal movements  Objective  /83   Pulse 98   Ht 5' 7" (1 702 m)   Wt 104 kg (228 lb 9 6 oz)   LMP 2017 (Exact Date)   BMI 35 80 kg/m²   FHR: 140    Patient's Active Problem List  Patient Active Problem List   Diagnosis    Susceptible to varicella (non-immune), currently pregnant    Obesity affecting pregnancy in second trimester    Genital herpes simplex    Obesity    40 weeks gestation of pregnancy    Request for sterilization    Prenatal care in third trimester    Group beta Strep positive     Plan  Present for induction of labor on Saturday at 8 am  Call with vaginal bleeding, loss of fluid, regular contractions, decreased fetal movement, other needs or concerns    Return after delivery for postpartum car

## 2018-09-13 NOTE — PATIENT INSTRUCTIONS
Present for induction of labor on Saturday at 8 am  Call with vaginal bleeding, loss of fluid, regular contractions, decreased fetal movement, other needs or concerns    Return after delivery for postpartum car

## 2018-09-15 ENCOUNTER — HOSPITAL ENCOUNTER (INPATIENT)
Facility: HOSPITAL | Age: 33
LOS: 2 days | Discharge: HOME | End: 2018-09-17
Attending: OBSTETRICS & GYNECOLOGY | Admitting: OBSTETRICS & GYNECOLOGY
Payer: COMMERCIAL

## 2018-09-15 DIAGNOSIS — Z3A.40 40 WEEKS GESTATION OF PREGNANCY: ICD-10-CM

## 2018-09-15 LAB
ABO GROUP BLD: NORMAL
BASE EXCESS BLDCOA CALC-SCNC: -1.8 MMOL/L (ref 3–11)
BASE EXCESS BLDCOV CALC-SCNC: -2.3 MMOL/L (ref 1–9)
BLD GP AB SCN SERPL QL: NEGATIVE
ERYTHROCYTE [DISTWIDTH] IN BLOOD BY AUTOMATED COUNT: 12.9 % (ref 11.6–15.1)
HCO3 BLDCOA-SCNC: 25.4 MMOL/L (ref 17.3–27.3)
HCO3 BLDCOV-SCNC: 21.9 MMOL/L (ref 12.2–28.6)
HCT VFR BLD AUTO: 36.6 % (ref 34.8–46.1)
HGB BLD-MCNC: 12.3 G/DL (ref 11.5–15.4)
MCH RBC QN AUTO: 31.4 PG (ref 26.8–34.3)
MCHC RBC AUTO-ENTMCNC: 33.6 G/DL (ref 31.4–37.4)
MCV RBC AUTO: 93 FL (ref 82–98)
O2 CT VFR BLDCOA CALC: 5.9 ML/DL
OXYHGB MFR BLDCOA: 27 %
OXYHGB MFR BLDCOV: 83.4 %
PCO2 BLDCOA: 52 MM[HG] (ref 30–60)
PCO2 BLDCOV: 36.1 MM HG (ref 27–43)
PH BLDCOA: 7.31 [PH] (ref 7.23–7.43)
PH BLDCOV: 7.4 [PH] (ref 7.19–7.49)
PLATELET # BLD AUTO: 209 THOUSANDS/UL (ref 149–390)
PMV BLD AUTO: 10.9 FL (ref 8.9–12.7)
PO2 BLDCOA: 15.4 MM HG (ref 5–25)
PO2 BLDCOV: 38.6 MM HG (ref 15–45)
RBC # BLD AUTO: 3.92 MILLION/UL (ref 3.81–5.12)
RH BLD: POSITIVE
SAO2 % BLDCOV: 18.6 ML/DL
SPECIMEN EXPIRATION DATE: NORMAL
WBC # BLD AUTO: 7.66 THOUSAND/UL (ref 4.31–10.16)

## 2018-09-15 PROCEDURE — 0UQGXZZ REPAIR VAGINA, EXTERNAL APPROACH: ICD-10-PCS | Performed by: OBSTETRICS & GYNECOLOGY

## 2018-09-15 PROCEDURE — 86850 RBC ANTIBODY SCREEN: CPT | Performed by: OBSTETRICS & GYNECOLOGY

## 2018-09-15 PROCEDURE — 86900 BLOOD TYPING SEROLOGIC ABO: CPT | Performed by: OBSTETRICS & GYNECOLOGY

## 2018-09-15 PROCEDURE — 59400 OBSTETRICAL CARE: CPT | Performed by: OBSTETRICS & GYNECOLOGY

## 2018-09-15 PROCEDURE — 86901 BLOOD TYPING SEROLOGIC RH(D): CPT | Performed by: OBSTETRICS & GYNECOLOGY

## 2018-09-15 PROCEDURE — 85027 COMPLETE CBC AUTOMATED: CPT | Performed by: OBSTETRICS & GYNECOLOGY

## 2018-09-15 PROCEDURE — 82805 BLOOD GASES W/O2 SATURATION: CPT | Performed by: OBSTETRICS & GYNECOLOGY

## 2018-09-15 PROCEDURE — 86592 SYPHILIS TEST NON-TREP QUAL: CPT | Performed by: OBSTETRICS & GYNECOLOGY

## 2018-09-15 PROCEDURE — 4A1HXCZ MONITORING OF PRODUCTS OF CONCEPTION, CARDIAC RATE, EXTERNAL APPROACH: ICD-10-PCS | Performed by: OBSTETRICS & GYNECOLOGY

## 2018-09-15 PROCEDURE — 3E033VJ INTRODUCTION OF OTHER HORMONE INTO PERIPHERAL VEIN, PERCUTANEOUS APPROACH: ICD-10-PCS | Performed by: OBSTETRICS & GYNECOLOGY

## 2018-09-15 RX ORDER — ONDANSETRON 2 MG/ML
4 INJECTION INTRAMUSCULAR; INTRAVENOUS EVERY 8 HOURS PRN
Status: DISCONTINUED | OUTPATIENT
Start: 2018-09-15 | End: 2018-09-17 | Stop reason: HOSPADM

## 2018-09-15 RX ORDER — DIAPER,BRIEF,INFANT-TODD,DISP
1 EACH MISCELLANEOUS 4 TIMES DAILY PRN
Status: DISCONTINUED | OUTPATIENT
Start: 2018-09-15 | End: 2018-09-17 | Stop reason: HOSPADM

## 2018-09-15 RX ORDER — DOCUSATE SODIUM 100 MG/1
100 CAPSULE, LIQUID FILLED ORAL 2 TIMES DAILY
Status: DISCONTINUED | OUTPATIENT
Start: 2018-09-15 | End: 2018-09-17 | Stop reason: HOSPADM

## 2018-09-15 RX ORDER — LIDOCAINE HYDROCHLORIDE 10 MG/ML
INJECTION, SOLUTION EPIDURAL; INFILTRATION; INTRACAUDAL; PERINEURAL
Status: DISPENSED
Start: 2018-09-15 | End: 2018-09-16

## 2018-09-15 RX ORDER — SODIUM CHLORIDE, SODIUM LACTATE, POTASSIUM CHLORIDE, CALCIUM CHLORIDE 600; 310; 30; 20 MG/100ML; MG/100ML; MG/100ML; MG/100ML
125 INJECTION, SOLUTION INTRAVENOUS CONTINUOUS
Status: DISCONTINUED | OUTPATIENT
Start: 2018-09-15 | End: 2018-09-15

## 2018-09-15 RX ORDER — OXYTOCIN/RINGER'S LACTATE 30/500 ML
1-30 PLASTIC BAG, INJECTION (ML) INTRAVENOUS
Status: DISCONTINUED | OUTPATIENT
Start: 2018-09-15 | End: 2018-09-15

## 2018-09-15 RX ORDER — ACETAMINOPHEN 325 MG/1
650 TABLET ORAL EVERY 6 HOURS PRN
Status: DISCONTINUED | OUTPATIENT
Start: 2018-09-15 | End: 2018-09-17 | Stop reason: HOSPADM

## 2018-09-15 RX ORDER — IBUPROFEN 600 MG/1
600 TABLET ORAL EVERY 6 HOURS PRN
Status: DISCONTINUED | OUTPATIENT
Start: 2018-09-15 | End: 2018-09-17 | Stop reason: HOSPADM

## 2018-09-15 RX ORDER — OXYCODONE HYDROCHLORIDE AND ACETAMINOPHEN 5; 325 MG/1; MG/1
1 TABLET ORAL EVERY 4 HOURS PRN
Status: DISCONTINUED | OUTPATIENT
Start: 2018-09-15 | End: 2018-09-17 | Stop reason: HOSPADM

## 2018-09-15 RX ORDER — CALCIUM CARBONATE 200(500)MG
1000 TABLET,CHEWABLE ORAL DAILY PRN
Status: DISCONTINUED | OUTPATIENT
Start: 2018-09-15 | End: 2018-09-17 | Stop reason: HOSPADM

## 2018-09-15 RX ORDER — OXYCODONE HYDROCHLORIDE AND ACETAMINOPHEN 5; 325 MG/1; MG/1
2 TABLET ORAL EVERY 4 HOURS PRN
Status: DISCONTINUED | OUTPATIENT
Start: 2018-09-15 | End: 2018-09-17 | Stop reason: HOSPADM

## 2018-09-15 RX ORDER — OXYTOCIN/RINGER'S LACTATE 30/500 ML
250 PLASTIC BAG, INJECTION (ML) INTRAVENOUS ONCE
Status: COMPLETED | OUTPATIENT
Start: 2018-09-15 | End: 2018-09-15

## 2018-09-15 RX ADMIN — SODIUM CHLORIDE, SODIUM LACTATE, POTASSIUM CHLORIDE, AND CALCIUM CHLORIDE 125 ML/HR: .6; .31; .03; .02 INJECTION, SOLUTION INTRAVENOUS at 11:09

## 2018-09-15 RX ADMIN — IBUPROFEN 600 MG: 600 TABLET, FILM COATED ORAL at 16:42

## 2018-09-15 RX ADMIN — SODIUM CHLORIDE 2.5 MILLION UNITS: 9 INJECTION, SOLUTION INTRAVENOUS at 14:14

## 2018-09-15 RX ADMIN — ACETAMINOPHEN 650 MG: 325 TABLET, FILM COATED ORAL at 20:52

## 2018-09-15 RX ADMIN — BENZOCAINE AND LEVOMENTHOL: 200; 5 SPRAY TOPICAL at 16:42

## 2018-09-15 RX ADMIN — Medication 2 MILLI-UNITS/MIN: at 11:09

## 2018-09-15 RX ADMIN — SODIUM CHLORIDE 5 MILLION UNITS: 0.9 INJECTION, SOLUTION INTRAVENOUS at 11:09

## 2018-09-15 NOTE — H&P
History & Physical - OB/GYN   Carol Ferreira 29 y o  female MRN: [id number]  Unit/Bed#: L&D 326-01 Encounter: [id number]        [facility]    Chief complaint:  Elective IOL    HPI: 29 y o   at 36w2d by LMP with LUIS ANTONIO 2018 here for elective induction of labor  Contractions:  Yes, mild occurring occasionally  Fetal movement:  yes  Vaginal bleeding:   no  Leaking of fluid:  no      Pregnancy Complications:  GBS positive  Obesity  General herpes  Varicella nonimmune      PMH:  Past Medical History:   Diagnosis Date    Herpes     last outbreak was when she was pregnant in        PSH:  No past surgical history on file  Social Hx:  Social History     Social History    Marital status: Single     Spouse name: N/A    Number of children: N/A    Years of education: N/A     Occupational History    Not on file  Social History Main Topics    Smoking status: Never Smoker    Smokeless tobacco: Never Used    Alcohol use Yes      Comment: before pregnant     Drug use: No    Sexual activity: Yes     Partners: Male     Birth control/ protection: None     Other Topics Concern    Not on file     Social History Narrative    No narrative on file         OB Hx:  Obstetric History       T0      L1     SAB0   TAB0   Ectopic0   Multiple0   Live Births1       # Outcome Date GA Lbr Dustin/2nd Weight Sex Delivery Anes PTL Lv   2 Current            1 Para 08/02/15 40w0d  3600 g (7 lb 15 oz) F Vag-Spont None  ZULMA          Meds:  No current facility-administered medications on file prior to encounter        Current Outpatient Prescriptions on File Prior to Encounter   Medication Sig Dispense Refill    Prenatal Vit-Fe Fumarate-FA (PRENATAL VITAMIN) 28-0 8 mg Take by mouth daily      Pyridoxine HCl (VITAMIN B-6 CR PO) Take by mouth      valACYclovir (VALTREX) 500 mg tablet Take 1 tablet (500 mg total) by mouth daily for 42 days 42 tablet 0       Allergies:  No Known Allergies    Labs:  Blood type: O+  Antibody: negative  Group B strep: positive  HIV: negative  Hepatitis B: negative  RPR: NR  Rubella: Immune  Varicella Not immune  1 hour Glucose: 120    Physical Exam:  /71 (BP Location: Left arm)   Pulse 86   Temp 97 8 °F (36 6 °C) (Oral)   Resp 18   Ht 5' 7" (1 702 m)   Wt 103 kg (228 lb)   LMP 2017 (Exact Date)   Breastfeeding? Yes   BMI 35 71 kg/m²     Weight:  228 lb       Physical Exam   Constitutional: She is oriented to person, place, and time  She appears well-developed and well-nourished  No distress  HENT:   Head: Normocephalic and atraumatic  Cardiovascular: Normal rate, regular rhythm, normal heart sounds and intact distal pulses  Pulmonary/Chest: Effort normal and breath sounds normal  No respiratory distress  She has no wheezes  She has no rales  She exhibits no tenderness  Abdominal: Soft  She exhibits no distension  There is no tenderness  There is no rebound and no guarding  Gravid   Musculoskeletal: Normal range of motion  She exhibits no tenderness  Neurological: She is alert and oriented to person, place, and time  Skin: Skin is warm and dry  She is not diaphoretic  Psychiatric: She has a normal mood and affect  Her behavior is normal        Estimated Fetal Weight: 7 5-8 lbs  Presentation: vertex    SVE: 3 / 80% / -2  FHT:  140 / Moderate 6 - 25 bpm / Cat I, Reactive  Spiro: irregular    Membranes: Intact    Assessment:   29 y o   at 40w4d     Plan:   1  Admit to L&D  2  CBC, RPR, type and screen  3  Penicillin for GBS prophylaxis  4  Pitocin titration    Epidural upon request    Discussed with Dr Melissa Mishra MD, MPH  OB/GYN, PGY3  9/15/2018, 10:28 AM

## 2018-09-15 NOTE — OB LABOR/OXYTOCIN SAFETY PROGRESS
Oxytocin Safety Progress Check Note - Carol Bruce 29 y o  female MRN: [id number]    Unit/Bed#: L&D 326-01 Encounter: [id number]    Obstetric History       T0      L1     SAB0   TAB0   Ectopic0   Multiple0   Live Births1      Gestational Age: 36w2d  Dose (umm-units/min) Oxytocin: 8 umm-units/min  Contraction Frequency (minutes): 2-3  Contraction Quality: Mild  Tachysystole: No   Dilation: 8        Effacement (%): 90  Station: -1  Baseline Rate: 140 bpm     FHR Category: Category I          Notes/comments:   Pt feeling pain/pressure  Making good progress  Continue current management  Anticipate         Talita Monroy DO 9/15/2018 2:14 PM

## 2018-09-15 NOTE — L&D DELIVERY NOTE
Delivery Summary - OB/GYN   Jackson Soni 29 y o  female MRN: [id number]  Unit/Bed#: L&D 326-01 Encounter: [id number]    Pre-delivery Diagnosis:   1  40w4d pregnancy  2  GBS positive  3  Obesity  4  Herpes  5  Varicella nonimmune    Post-delivery Diagnosis: same, delivered    Attending: Dr Talon Martins     Assistant(s): Dr Haylie Frazier    Procedure:     Anesthesia: Local    Estimated Blood Loss:  300 mL    Specimens:   1  Arterial and venous cord gases  2  Cord blood  3  Segment of umbilical cord  4  Placenta to storage     Complications:  None apparent    Findings:  1  Viable female  delivered on 09/15/18 at 1455 weight pending;  Apgar scores of 9 at one minute and 9 at five minutes  2  Spontaneous delivery of placenta with centrally inserted 3-vessel cord  3  Terminal Meconium  4  Vaginal laceration, repaired with 3-0Vicryl rapid  Blood gas, venous, cord    Collection Time: 09/15/18  2:57 PM   Result Value Ref Range    pH, Cord Chan 7 400 7 190 - 7 490    pCO2, Cord Chan 36 1 27 0 - 43 0 mm HG    pO2, Cord Chan 38 6 15 0 - 45 0 mm HG    HCO3, Cord Chan 21 9 12 2 - 28 6 mmol/L    Base Exc, Cord Chan -2 3 (L) 1 0 - 9 0 mmol/L    O2 Cont, Cord Chan 18 6 mL/dL    O2 HGB,VENOUS CORD 83 4 %   Blood gas, arterial, cord    Collection Time: 09/15/18  2:57 PM   Result Value Ref Range    pH, Cord Art 7 306 7 230 - 7 430    pCO2, Cord Art 52 0 30 0 - 60 0    pO2, Cord Art 15 4 5 0 - 25 0 mm HG    HCO3, Cord Art 25 4 17 3 - 27 3 mmol/L    Base Exc, Cord Art -1 8 (L) 3 0 - 11 0 mmol/L    O2 Content, Cord Art 5 9 ml/dl    O2 Hgb, Arterial Cord 27 0 %            Disposition: Patient tolerated the procedure well and was recovering in labor and delivery room with family and  before being transferred to the post-partum floor  Procedure Details     Description of procedure  Carol John Sebastian is a 30 yo now  who was admitted at 40 weeks 4 days for an elective induction of labor    She was started on Pitocin titration followed by spontaneous rupture of membranes for clear  fluid  She labored and progressed to complete without complication  After pushing for 2 minutes, on 09/15/18 at 1455 patient delivered a viable female , weight pending, Apgars of 9 (1 min) and 9 (5 min)  The fetal vertex delivered spontaneously  There was a loose nuchal cord that was reduced at the perineum  The anterior shoulder delivered atraumatically with maternal expulsive forces and the assistance of downward traction  The posterior shoulder delivered with maternal expulsive forces and the assistance of upward traction  The remainder of the fetus delivered spontaneously  Upon delivery, the infant was placed on the mothers abdomen and the cord was clamped and cut  The infant was noted to cry spontaneously and was moving all extremities appropriately  There was no evidence for injury  Awaiting nurse resuscitators evaluated the  at bedside  Arterial and venous cord blood gases and cord blood was collected for analysis  These were promptly sent to the lab  In the immediate post-partum, 30 units of IV pitocin was administered and the uterus was noted to contract down well with massage and pitocin  The placenta delivered spontaneously at 1458 and was noted to have a centrally inserted 3 vessel cord  The vagina, cervix, and perineum were inspected and there was noted to be a small first-degree vaginal laceration that was repaired with a figures of eight using 3 0 Vicryl  Good hemostasis was confirmed at the conclusion of this procedure  At the conclusion of the delivery, all needle, sponge, and instrument counts were noted to be correct  Patient tolerated the procedure well and was allowed to recover in labor and delivery room with family and  before being transferred to the post-partum floor  Dr Jessica Otero was present and participated in all key portions of the case      Aletha Mckinnon MD, MPH  OB/GYN, PGY3  9/15/2018, 3:18 PM

## 2018-09-15 NOTE — DISCHARGE INSTRUCTIONS
Parto vaginal   LO QUE USTED DEBE SABER:   El parto vaginal se produce cuando el bebé nace a través de la vagina (canal del parto)  DESPUÉS DE SER DADO DE LUISANA:   Medicamentos:   · Los antiiflamatorios no esteroideos (AINEs) ayudan a reducir inflamación y dolor o fiebre  Brittany medicamento esta disponible con o sin juan c receta médica  Los AINEs podrían causar sangrado estomacal o problemas en los riñones en doo  Si usted esta tomando un anticoágulante, siempre  pregunte a harman proveedor de lorri si los AINEs son seguros para usted  Antes de [place], evie siempre la etiqueta de información y siga colin indicaciones  · [place] colin medicamentos lily se le haya indicado  Llame a harman proveedor de lorri si usted piensa que harman medicamento no le está ayudando o si tiene efectos secundarios  Infórmele si es alérgico a cualquier medicamento  Mantenga juan c lista vigente de los medicamentos, vitaminas, y hierbas que saida  [address] cantidades, la frecuencia con que los saida y por qué los saida  Traiga la lista o los envases de colin medicamentos a las citas de seguimiento  Mantenga la lista consigo en shayne de juan c emergencia  Bote las listas [place]  Programe juan c stoney con harman médico de cabecera:  Genia Chris de las mujeres necesitan regresar donde harman médico 6 semanas después de un parto vaginal  Pregunte sobre cómo cuidar de colin heridas o suturas  Anote colin preguntas para que recuerde hacerlas aditya colin citas  Actividad:  Descanse siempre que le sea posible  Trate de minimizar colin actividades  Usted podría empezar a hacer algo de ejercicio poco tiempo después de tener a harman bebé  Hable con harman médico de cabecera antes de empezar a ejercitarse  Si usted trabaja fuera del hogar, pregunte también cuándo debe regresar a harman trabajo  Ejercicios de Kegel:  Los ejercicios de Kegel podrían ayudar a que colin músculos vaginales y rectales sanen más rápido   Para hacer éstos ejercicios, flexione y relaje los músculos alrededor de harman vagina  Los ejercicios de Kegel fortalecen los músculos  Cuidado de los senos:  Cuando le baja la [place], colin senos pueden sentirse llenos y duros  Pídale más información a harman médico acerca de cómo cuidar colin senos, aunque no esté amamantando a harman bebé  Estreñimiento: No dieter esfuerzo para realizar juan c evacuación intestinal si esta teniendo dificultad para hacerlo  Alimentos altos en [place], [place] líquido, y el ejercicio regular puede ayudar a prevenir estreñimiento  Ejemplos de estos alimentos incluyen, frutas y afrecho  El [facility] de [place] y agua son Juanna Eldon buenos líquidos para newton  El ejercicio regular ayuda con la función de harman sistema digestivo  Es posible que usted también tenga que usar fibra que se puede obtener sin Yasmin Sky y laxantes  [employer] se le haya indicado  Hemorroides:  El embarazo puede causar hemorroides graves  Es posible que usted tenga dolor en el recto a causa de las hemorroides  Pregúntele acerca del cuidado de las hemorroides  Cuidado del perineo:  El perineo es el área entre la vagina y el ano  Evalene Morrell y seca el área para ayudarla a sanar y prevenir juan c infección  Lave el área cuidadosamente con agua y Britany Dontrell se Deepika [place]  Enjuague el perineo con agua tibia cuando [place] el servicio sanitario  Harman médico podría sugerirle usar un baño de asiento de agua tibia para disminuir el dolor  Un baño de asiento es cuando se llena juan c gela de baño o basenilla hasta el nivel de las caderas  Se recomienda sentarse en el baño de asiento de 20 a 30 minutos, o lily [employer]  Flujo vaginal:  Usted tendrá flujo vaginal, llamado loquios, después del parto  Los loquios son de color tirado brillante el primer o keith día  Al cuarto día, la cantidad [place], y se torna de un color tirado - marrón  Use juan c toalla sanitaria en vez de un tampón para prevenir infecciones vaginales  Es normal tener loquios hasta 8 semanas después de que nazca harman bebé  Períodos menstruales: Harman período puede empezar de [employer] 7 a 12 semanas después de que nazca harman bebé  Si está amamantando, puede newton [employer] para que harman período regrese  Usted puede quedar embarazada de nuevo aún si no tiene harman periodo menstrual  Hable con harman médico de cabecera acerca de un método anticonceptivo que sea vela para usted, si no desea quedar embarazada  Cambios en el estado de ánimo:  Muchas madres tienen algún tipo de cambio en harman estado de ánimo después de mona a bernard a harman bebé  Algunos de [employer] se producen debido a la falta de sueño, cambios hormonales, y el cuidado de un bebé recién nacido  Algunos cambios de ánimo pueden ser [address], lily la depresión posparto  Hable con harman médico de cabecera si se siente incapaz de cuidarse a sí misma o a harman bebé  Relaciones sexuales:  Probablemente tendrá que evitar tener relaciones sexuales por 6 a 7 semanas después de jenny dado a bernard  Usted podría notar menos deseo sexual, o incluso podría sentir dolor al [employer]  Se recomienda el uso de un lubricante vaginal (gel) para ayudarle a sentirse más cómoda [employer]  Comuníquese con harman médico de cabecera si:   · Usted presenta sangrado vaginal que empapa 1 toalla higiénica o más en 1 hora  · Tiene fiebre  · Harman dolor no se le [place] o [place]  · La piel entre harman vagina y recto está inflamada, caliente o Monica Kenton  · Tamara senos están inflamados, duros o dolorosos  · Se siente muy clarissa o deprimida  · Se siente más cansada de lo usual      · Tiene preguntas o inquietudes acerca de harman condición o cuidados  Busque ayuda inmediata o llame al 911 si:   · Tiene pus o flujo amarillo saliendo de la vagina o herida  · Está orinando muy poquito o no orina del todo  · Tamara brazos o piernas se sienten calientes, sensibles y dolorosos  Podrían verse inflamados y rojos       · Siente desvanecimiento, tiene dolor de pecho repentino o peor o dificultad para respirar  Usted podría sentir más dolor cuando respira profundo o tose o podría toser wayne  © 2014 [address] Mallorie Col[address]res is for End User's use only and may not be sold, redistributed or otherwise used for commercial purposes  All illustrations and images included in CareNotes® are the copyrighted property of A D A M , Inc  or Doug Ospina  Esta información es sólo para uso en educación  Harman intención no es darle un consejo médico sobre enfermedades o tratamientos  Colsulte con harman Zelpha Roch farmacéutico antes de seguir cualquier régimen médico para saber si es seguro y efectivo para usted  Cuidado de los senos para las madres lactantes   LO QUE USTED DEBE SABER:   Tamara senos experimentarán ciertos cambios normales mientras amamanta a harman bebé  A veces surgen problemas con los senos y los pezones aditya la lactancia  Entérese de qué cambios son normales y cuáles podrían ser problemáticos  El cuidado de tamara senos puede ayudarle a evitar y mantener bajo control ciertos problemas, de modo que tanto usted lily harman bebé disfruten de los beneficios de la lactancia materna  DESPUÉS DE SER DADO DE LUISANA:   Cambios en los senos aditya la lactancia materna:   · Aditya los primeros días posteriores al nacimiento del bebé, harman cuerpo producirá juan c pequeña cantidad de [place] materna, llamada calostro  Harman cuerpo comenzará a producir la [employer] dentro de 2 a 5 días  Es posible que la leche madura tarde hasta 10 días en bajar  Cuando baje la [employer], tamara senos se pondrán llenos y firmes  Es posible que estén un poco doloridos  · Tamara senos dejarán de sentirse llenos cuando amamante al bebé  Es posible que sienta un hormigueo cuando sale la leche de los senos   Esta sensación se conoce lily el reflejo de bajada de [place]  Pasados 7 o más días, es posible que no sienta los senos tan llenos  Tamara pezones se deberían de jason iguales que antes de comenzar a amamantar   Es buena señal si siente los senos llenos antes de amamantar y vacíos después de alimentar al bebé  Problemas que puede tener con los senos mientras esta lactando:   · Dolor en los pezones  puede ocurrir cuando comience a amamantar a harman bebé  Es normal sentir un poco de [employer]  Es posible que le duelan los pezones si el bebé no se prende correctamente del pecho  El hecho de colocar al bebé en la posición correcta para que se prenda audrey del pecho pueden eliminar o aliviar el dolor en los pezones  Pida a harman médico que le enseñe a hacer que harman bebé se prenda correctamente del pecho  También le puede ser de beneficio colocar juan c compresa tibia en colin pezones para ayudar a reducir el dolor  · Conductos lactíferos obstruidos  podría causar que se jennifer bultos dolorosos en los senos  Es posible que los conductos lactíferos se obstruyan si los senos no se vacían por completo cuando amamanta al bebé  Luba Gary y apriete suavemente colin senos cuando el bebé tome un descanso de amamantar  Un masaje suave puede destapar un conducto obstruido  Extraiga la [employer] quede en colin senos juan c vez que el bebé termine de amamantar  Evite usar blusas o corpiños con armazón de alambre Jacobo's senos pues estos podrían poner demasiada presión en colin senos  · La congestión mamaria  ocurre cuando la leche baja después de que empiece a amamantar  La congestión mamaria puede causar que colin senos se inflamen y duelan  Puede también que esto suceda si el bebé se saltea juan c comida o si no le da el pecho cuando el bebé se lo pide  La mejor manera de combatir los síntomas de la ingurgitación Ethel es mona de comer al bebé a menudo para vaciar los senos  Puede que al bebé le resulte difícil prenderse de los senos cuando están congestionados  Si esto sucede, exprima juan c pequeña cantidad de [place] y después dieter que el bebé se prenda del pecho   Puede colocarse compresas frías, paquetes de gel o de hielo [employer] senos para aliviar el dolor y la inflamación  Pregunte a harman médico la frecuencia y cantidad de tiempo que usted debería usar las compresas story o bolsa de hielo  · Juan C infeccion mamaria llamada mastitis  podría desarrollarse si usted tiene juan c obstrucción en los conductos mamarios o juan c congestión  Nicole resultado de la mastitis, los senos se enrojecen, se inflaman y duelen  Es posible que también presente síntomas similares a los de la gripe, nicole escalofríos y [place]  Colóquese calor sobre los senos para aliviar el dolor  Puede usar un paño húmedo caliente sobre el seno dolorido o sobre ambos senos  Pregunte a harman médico con qué frecuencia debería de hacer esto  Es posible que harman médico le sugiera que tome un medicamento antiinflamatorio no esteroideo, nicole ibuprofeno, para aliviar el dolor y bajar la inflamación  Puede que harman médico de cabecera también le recete un antibiótico para el tratamiento de la mastitis  Pregunte a harman médico si debería amamantar al bebé cuando tiene juan c infección Ethel  Cómo puede contribuir a prevenir o controlar los problemas con los senos mientras estoy lactando:   · Aprenda a colocar al bebé para que se prenda del pecho correctamente  Para que el bebé se prenda correctamente, asegúrese de que harman boca cubra la mayor parte de la areola (la parte oscura que rodea al pezón)  No debería prenderse solamente del pezón  El bebé está en la posición correcta si usted se siente a gusto y no siente dolor  Cuando el michelle se prende correctamente el puede recibir suficiente leche y esto también puede prevenir dolor en los pezones y otros problemas con los senos  Hay varias posiciones para amamantar que usted [employer]  Escoja la posición que funciona mejor para usted y se bebé  Solicite mas información a harman medico sobre nicole sujetar y amamantar a harman bebé  · Evite que el bebé la [place]  Es posible que el bebé comience a cortar los dientes a los 3 o 4 meses de edad   Para evitar que la [place], desprenda la succión juan c ve que haya acabado de amamantar o si se queda dormido  Para desprender la succión coloco harman dedo en la esquina de harman boca  Si la muerde, reaccione con sorpresa o disgusto  Anímelo cuando no la muerda  · Amamante a harman bebé de forma regular  Alimente al bebé de 8 a 12 veces al día  Es posible que deba despertarlo para darle de comer aditya la noche  Puede amamantarlo con 1 o ambos senos cada vez  El bebé debería alimentarse por igual de los [address] senos a lo lisette del día  Si el bebé sólo saida leche de 1 seno juan c vez, ofrézcale el otro seno idris la próxima vez que lo amamante  · Programe y Nenita Barrientos a todas tamara citas de seguimiento  Hable con el médico del bebé o con harman médico aditya las visitas de seguimiento si está teniendo problemas con tamara senos  Es posible que el AnaBios sugiera que Nenita Barrientos, [place] o con harman laisha, a clases de lactancia materna  También podría ser buena idea participar en un malena de apoyo para madres lactantes  El médico puede sugerirle que consulte a un especialista en lactancia  Se trata de un médico que puede ayudarle con la lactancia materna  Comuníquese con harman médico de cabecera si:   · Tiene fiebre o escalofríos  · Usted tiene sita en el cuerpo y siente que no tiene suficiente energía  · Refugio o ambos senos están rojos, inflamados o duros, doloridos y se sienten tibios o calientes  · Tiene congestión mamaria que no mejora dentro de 24 horas  · Ve o siente un bulto en harman seno que le duele al tocarlo  · Le duelen los pezones cuando amamanta o entre sesiones de lactancia  · Tamara pezones están rojos, secos, agrietados, sangran o tienen costras  · Tiene alguna pregunta o inquietud acerca de harman condición o cuidado  © 2014 [address] is for End User's use only and may not be sold, redistributed or otherwise used for commercial purposes   All illustrations and images included in CareNotes® are the copyrighted property of A  D A M , Inc  or Doug Bhavesh  Esta información es sólo para uso en educación  Awad intención no es darle un consejo médico sobre enfermedades o tratamientos  Colsulte con awad Suzen Owanka farmacéutico antes de seguir cualquier régimen médico para saber si es seguro y efectivo para usted  Sangrado posparto   LO QUE NECESITA SABER:   El sangrado posparto es un sangrado vaginal después de mona a bernard al bebé  Brittany sangrado es normal tanto si tuvo un parto vaginal lily si se trata de juan c cesárea  Se compone de Yony y del tejido que recubrían el interior de awad útero cuando estaba [place]  Shae Matthews EL LUISANA HOSPITALARIA:   Qué esperar cuando hay sangrado posparto:  El sangrado posparto usualmente tiene Maryana Watson duración de 10 juanito y podría durar hasta 6 semanas  Awad sangrado puede variar de leve (hi no mariela juan c toalla higiénica) a ser profuso (satura juan c toalla higiénica en 1 hora)  Usualmente, usted tiene un sangrado profuso xavier después del parto, el cual disminuye en las próximas de semanas hasta que cesa completamente  El sangrado es tirado o alas oscuro con coágulos por los primeros 1 a 3 días  Después se torna chin por varias semanas y luego se vuelve un flujo gordon o amarillo hasta que cesa por completo  Programe juan c stoney con awad obstétrico lily se le indique:  No tenga relaciones sexuales hasta que awad médico lo autorice  Anote colin preguntas para que se acuerde de hacerlas aditya colin visitas  Comuníquese con awad médico o obstreta si:   · El [place] o usted tiene juan c hemorragia que satura juan c toalla higiénica en 1 hora por 2 horas seguidas  · Le salen grandes coágulos de Yony  · Usted está respirando más rápido que lo acostumbrado o awad corazón late más rápido que lo habitual     · Usted está orinando menos que de [place], o nada en lo absoluto  · Usted se siente mareado  · Usted tiene preguntas o inquietudes acerca de awad condición o cuidado    Busque atención médica de inmediato o llame al 911 si:   · A usted de repente le hace falta el aire y se siente desvanecer  · Tiene dolor repentino en el pecho  © 2017 [address] Sebastian Hind[address] Information is for End User's use only and may not be sold, redistributed or otherwise used for commercial purposes  All illustrations and images included in CareNotes® are the copyrighted property of A D A M , Inc  or Doug Ospina  Esta información es sólo para uso en educación  Awad intención no es darle un consejo médico sobre enfermedades o tratamientos  Colsulte con awad Benuel Lozano farmacéutico antes de seguir cualquier régimen médico para saber si es seguro y efectivo para usted  Depresión posparto   LO QUE NECESITA SABER:   ¿Qué es la depresión posparto? La depresión posparto es un trastorno del humor que se presenta después de mona a bernard un bebé  Un estado de ánimo es juan c emoción o un sentimiento  Los estados de ánimo afectan nuestro comportamiento y la manera en que nos sentimos acerca de nosotros mismos y de la yovani en general  La depresión es un estado de ánimo de tristeza que no puede controlar  A menudo las mujeres sienten tristeza, temor o nerviosismo después de tener un bebé  Estas emociones se conocen lily melancolía posparto o melancolía por bebé, y por lo general desaparecen en 1 o 2 semanas  Con la depresión posparto, estos síntomas empeoran y rosas más de [address]  La depresión posparto es juan c condición grave que afecta colin relaciones personales y colin actividades diarias  ¿Cuáles son las causas de la depresión posparto? Los médicos no saben con exactitud cuáles son las causas de la depresión posparto  Es posible que sea el resultado de juan c disminución súbita de los niveles de hormonas después de mona a bernard un bebé  Es posible que corra un mayor riesgo de tener depresión posparto si ha tenido un episodio previo o si hay un historial de depresión en awad marlena   Varios factores pueden desencadenar la depresión posparto:  · Falta de apoyo por parte del padre del bebé u otros integrantes de harman marlena    · Sentir más cansancio de lo usual    · Estrés, indira alimentación o falta de sueño    · Dolor después del parto o al [employer] al bebé    · Cambio súbito en el estilo de yovani  ¿Cómo se diagnostica la depresión posparto? La depresión posparto afecta colin actividades diarias y colin relaciones con los demás  Los médicos le harán preguntas acerca de colin signos y síntomas, y cómo influyen en harman yovani  Los síntomas de la depresión posparto por lo general comienzan 1 mes después de mona a bernard  Se siente deprimida o pierde interés en las actividades que disfruta hi a diario aditya al menos 2 semanas  También tiene 4 o más de los siguientes síntomas:  · Se siente cansado o tiene menos energía que de costumbre  · Se siente insignificante o culpable la mayor parte del tiempo  · Considera la posibilidad de hacerse daño o de quitarse la yovani  · Harman apetito cambia  Es posible que pierda el apetito y baje de peso sin proponérselo  También es posible que tenga más apetito y que aumente de [place]  · Se siente inquieto, irritable o retraído  · Tiene dificultad para concentrarse y recordar las cosas  Tiene dificultad para llevar a cabo colin tareas cotidianas o newton decisiones  · Tiene dificultad para dormir, incluso después de que el bebé se ha dormido  ¿Cómo se trata la depresión posparto? · Psicoterapia:  Aditya la terapia usted hablará con los médicos acerca de cómo afrontar colin sentimientos y estados de ánimo  La terapia puede ser individual o grupal  También puede hacer terapia con colin familiares o con harman laisha  · Antidepresivos:  Dueñas se administra para disminuir o evitar los síntomas de la depresión  Por lo general hace falta [place]ton los antidepresivos aditya varias semanas para empezar a sentirse mejor   No suspenda los antidepresivos, a menos que harman médico le indique que lo [place] Migue podrían recetarle un antidepresivo diferente si un tipo de antidepresivo no le da resultado  ¿Qué puedo hacer para sentirme mejor? · Descanse:  No trate de hacer todo al mismo tiempo  Dieter solamente lo necesario y deje las otras cosas para más tarde  Pida ayuda a awad marlena o a colin amigos, especialmente si tiene otros hijos  Pida a awad laisha que la ayude a cuidar al bebé o a alimentarlo por la noche  Trate de dormir cuando el bebé saida juan c siesta  · Reciba apoyo emocional:  Comparta colin sentimientos con awad laisha, un amigo o con otra madre  · Cuídese:  [employer]comclemente y [employer] días  No se salte ninguna comida  Trate de salir de awad casa a diario aditya un rato  Ejercítese regularmente  Siga juan c dieta saludable  No consuma bebidas alcohólicas, ya que pueden empeorar la depresión  No se aísle  Salga a caminar o encuéntrese con un Hurshel Austin  También es importante que pase tiempo lucrecia todos los días  ¿Cómo puedo conseguir grupos de [de-identified] y mayor información? · [address] [place] [address], [employer]  [address] [place] [address], [address] [facility], [address]  [place]ha [place] [address]   Phone: [phone]  Phone: [phone]  Web Address: [employer] tn  ¿Cuándo cherelle comunicarme con mi médico?   · No puede asistir a awad próxima stoney  · Awad depresión no mejora con el tratamiento o empeora  · Usted tiene preguntas o inquietudes acerca de awad condición o cuidado  ¿Cuándo cherelle buscar atención inmediata o llamar al 911? · Considera la posibilidad de hacerse daño o quitarse la yovani o de lastimar o matar a awad bebé o a otra persona  · Siente que otras personas quieren hacerle daño  · Escucha voces que le dicen que se dieter daño a usted misma o a awad bebé  ACUERDOS SOBRE AWAD CUIDADO:   Usted tiene el derecho de ayudar a planear awad cuidado  Aprenda todo lo que pueda sobre awad condición y lily darle tratamiento   [address] opciones de tratamiento con colin médicos para decidir el cuidado que usted desea recibir  Usted siempre tiene el derecho de rechazar el tratamiento  Esta información es sólo para uso en educación  Harman intención no es darle un consejo médico sobre enfermedades o tratamientos  Colsulte con harman Alben Delia farmacéutico antes de seguir cualquier régimen médico para saber si es seguro y efectivo para usted  © 2017 [address] [facility] Information is for End User's use only and may not be sold, redistributed or otherwise used for commercial purposes  All illustrations and images included in CareNotes® are the copyrighted property of [employer] Inc  or Doug Ospina

## 2018-09-15 NOTE — OB LABOR/OXYTOCIN SAFETY PROGRESS
Oxytocin Safety Progress Check Note - Carol Ferreira 29 y o  female MRN: [id number]    Unit/Bed#: L&D 326-01 Encounter: [id number]    Obstetric History       T0      L1     SAB0   TAB0   Ectopic0   Multiple0   Live Births1      Gestational Age: 36w2d  Dose (umm-units/min) Oxytocin: 4 umm-units/min  Contraction Frequency (minutes): 3-5  Contraction Quality: Moderate  Tachysystole: No   Dilation: 4-5        Effacement (%): 80  Station: -2  Baseline Rate: 130 bpm     FHR Category: Category I          Notes/comments:   Patient uncomfortable with contractions, declined pain medication at this time  Category 1 fetal heart tracing  Will continue with Pitocin titration            Keisha Real MD 9/15/2018 1:19 PM

## 2018-09-15 NOTE — DISCHARGE SUMMARY
Discharge Summary     Jamaal Oviedo 29 y o  female MRN: [id number]   Unit/Bed#: L&D 326-01 Encounter: [id number]      Admission Date: 9/15/2018     Discharge Date: 2018    Attending: Ronny Dunbar MD  Delivering Attending: Dr Sarai Clark  Discharging Attending: Paula Martini MD    Principal Diagnosis: Pregnancy at 40w4d    Secondary Diagnosis:   GBS Positive   Obesity  General herpes  Varicella nonimmune    Procedures: spontaneous vaginal delivery    Anesthesia: local    Complications: none apparent    Hospital Course:   Jamaal Oviedo is a 28 yo now  who was admitted at 40 weeks 4 days for an elective induction of labor  She was started on Pitocin titration followed by sponateous rupture of membranes for clear fluid  She labored without complication  She delivered a viable female  on 9/15/18 at 1455 weighing 7 lbs 9 oz via spontaneous vaginal delivery  Apgars were 9 (1 min) and 9 (5 min)   was transferred to  nursery  Patient tolerated the procedure well and was transferred to recovery in stable condition  Her post-partum course was uncomplicated  Her postoperative pain was well controlled with oral analgesics  On day of discharge, she was ambulating and able to reasonably perform all ADLs  She was voiding and had appropriate bowel function  Pain was well controlled  She was discharged home on post-partum day #2 without complications  Patient was instructed to follow up with her OB as an outpatient and was given appropriate warnings to call provider if she develops signs of infection or uncontrolled pain  Condition at discharge: good      Discharge instructions/Information to patient and family:   - Do not place anything (no partner, tampons or douche) in your vagina for 6 weeks    -You may walk for exercise for the first 6 weeks then gradually return to your usual activities    -Please do not drive if you require narcotic pain medication    -You may take baths or shower per your preference    -Please look at your bust (breasts) in the mirror daily and call for redness or tenderness or increased warmth    -Please call us for temperature > 100 4*F or 38* C, worsening pain or a foul discharge       Discharge Medications:   Prenatal vitamin daily for 6 months or the duration of nursing whichever is longer  Motrin 600 mg orally every 6 hours as needed for pain  Tylenol (over the counter) per bottle directions as needed for pain: do NOT use with percocet  Hydrocortisone cream 1% (over the counter) applied 1-2x daily to hemorrhoids as needed    Provisions for Follow-Up Care:  See after visit summary for information related to follow-up care and any pertinent home health orders  Disposition: See After Visit Summary for discharge disposition information  Planned Readmission: No    Discharge Medications: For a complete list of the patient's medications, please refer to her med rec      You Araya DO

## 2018-09-16 RX ADMIN — BENZOCAINE AND LEVOMENTHOL: 200; 5 SPRAY TOPICAL at 03:50

## 2018-09-16 RX ADMIN — IBUPROFEN 600 MG: 600 TABLET, FILM COATED ORAL at 10:06

## 2018-09-16 RX ADMIN — IBUPROFEN 600 MG: 600 TABLET, FILM COATED ORAL at 19:39

## 2018-09-16 RX ADMIN — DOCUSATE SODIUM 100 MG: 100 CAPSULE, LIQUID FILLED ORAL at 10:06

## 2018-09-16 RX ADMIN — IBUPROFEN 600 MG: 600 TABLET, FILM COATED ORAL at 03:50

## 2018-09-16 NOTE — PROGRESS NOTES
Date: 09/16/18  Procedure: Spontaneous Vaginal Delivery  Postpartum/Postop Day #: 1     SUBJECTIVE:  Pain: no  Tolerating Oral Intake: yes   Voiding: yes  Flatus: yes  Bowel Movement: no  Ambulating: yes  Breastfeeding: yes  Chest Pain: no  Shortness of Breath: no  Leg Pain/Discomfort: no  Lochia: moderate  Other:     OBJECTIVE:   Vitals: Temp:  [97 8 °F (36 6 °C)-98 5 °F (36 9 °C)] 98 5 °F (36 9 °C)  HR:  [] 85  Resp:  [12-18] 12  BP: ()/(52-89) 109/74  General: No Acute Distress   Cardiovascular: Regular, Rate and Rhythm, no murmur, normal S1/S2   Lungs: Clear to Auscultation Bilaterally, no wheezing, non-labored breathing   Abdomen: Soft, non-distended, non-tender, no rebound, guarding or CVA tenderness   Fundus: Firm & Non-Tender,  Lower Extremities: Non-tender, SCDs   Other:    LABS / TESTS / MEDICATION:    Recent Results (from the past 72 hour(s))   POCT urine dip    Collection Time: 09/13/18  1:20 PM   Result Value Ref Range    SL AMB POCT URINE PROTEIN negative     GLUCOSE, UA negative    Type and screen    Collection Time: 09/15/18  9:30 AM   Result Value Ref Range    ABO Grouping O     Rh Factor Positive     Antibody Screen Negative     Specimen Expiration Date [id number]    CBC    Collection Time: 09/15/18  9:30 AM   Result Value Ref Range    WBC 7 66 4 31 - 10 16 Thousand/uL    RBC 3 92 3 81 - 5 12 Million/uL    Hemoglobin 12 3 11 5 - 15 4 g/dL    Hematocrit 36 6 34 8 - 46 1 %    MCV 93 82 - 98 fL    MCH 31 4 26 8 - 34 3 pg    MCHC 33 6 31 4 - 37 4 g/dL    RDW 12 9 11 6 - 15 1 %    Platelets 570 285 - 388 Thousands/uL    MPV 10 9 8 9 - 12 7 fL   Blood gas, venous, cord    Collection Time: 09/15/18  2:57 PM   Result Value Ref Range    pH, Cord Chan 7 400 7 190 - 7 490    pCO2, Cord Chan 36 1 27 0 - 43 0 mm HG    pO2, Cord Chan 38 6 15 0 - 45 0 mm HG    HCO3, Cord Chan 21 9 12 2 - 28 6 mmol/L    Base Exc, Cord Chan -2 3 (L) 1 0 - 9 0 mmol/L    O2 Cont, Cord Chan 18 6 mL/dL    O2 HGB,VENOUS CORD 83 4 % Blood gas, arterial, cord    Collection Time: 09/15/18  2:57 PM   Result Value Ref Range    pH, Cord Art 7 306 7 230 - 7 430    pCO2, Cord Art 52 0 30 0 - 60 0    pO2, Cord Art 15 4 5 0 - 25 0 mm HG    HCO3, Cord Art 25 4 17 3 - 27 3 mmol/L    Base Exc, Cord Art -1 8 (L) 3 0 - 11 0 mmol/L    O2 Content, Cord Art 5 9 ml/dl    O2 Hgb, Arterial Cord 27 0 %         docusate sodium 100 mg Oral BID       acetaminophen 650 mg Q6H PRN   benzocaine-menthol-lanolin-aloe  4x Daily PRN   calcium carbonate 1,000 mg Daily PRN   hydrocortisone 1 application 4x Daily PRN   ibuprofen 600 mg Q6H PRN   ondansetron 4 mg Q8H PRN   oxyCODONE-acetaminophen 1 tablet Q4H PRN   oxyCODONE-acetaminophen 2 tablet Q4H PRN   witch hazel-glycerin 1 pad PRN       ASSESSMENT AND PLAN:   29 y o  [id number] s/p  Postpartum day  1  1) Delivery: Continue routine postpartum care, encourage ambulation, advance diet as tolerated  2) Pain: well controlled   3)PPx: ambulation     Dispo: anticipate DC home tomorrow     Signature / Title:  Georgi Love, Ob/Gyn, PGY-4  Date: 2018  Time: 6:22 AM

## 2018-09-16 NOTE — LACTATION NOTE
This note was copied from a baby's chart  Mom requesting a bottle, states nipples are sore  Discussed risks for early supplementation: over feeding, longer digestion times, engorgement for mom, lower milk supply for mom, and nipple confusion  Benefits of breast feeding for infant's intestinal tract, less engorgement for mom, protection from multiple disease processes as infant develops, avoidance of over feeding for infant, less nipple confusion, and increased health benefits for mom  Assisted with cross cradle position and deeper latch  Mom expressed comfort  Lanolin provided  Mom states she will hold off on bottle for now  Encouraged to call for assistance with latch as needed, number provided

## 2018-09-16 NOTE — LACTATION NOTE
This note was copied from a baby's chart  Met with mother  Provided mother with Ready, Set, Baby booklet  Yi handout provided  Discussed Skin to Skin contact an benefits to mom and baby  Talked about the delay of the first bath until baby has adjusted  Spoke about the benefits of rooming in  Feeding on cue and what that means for recognizing infant's hunger  Avoidance of pacifiers for the first month discussed  Talked about exclusive breastfeeding for the first 6 months  Positioning and latch reviewed as well as showing images of other feeding positions  Discussed the properties of a good latch in any position  Reviewed hand/manual expression  Discussed s/s that baby is getting enough milk and some s/s that breastfeeding dyad may need further help  Gave information on common concerns, what to expect the first few weeks after delivery, preparing for other caregivers, and how partners can help  Resources for support also provided  Mom has breat pump at home  Mom states breastfeeding is going well and expresses comfort with latch  Encouraged MOB to call for assistance, questions, and concerns about breastfeeding  Extension provided

## 2018-09-17 VITALS
SYSTOLIC BLOOD PRESSURE: 109 MMHG | OXYGEN SATURATION: 98 % | TEMPERATURE: 98.3 F | HEART RATE: 91 BPM | WEIGHT: 228 LBS | HEIGHT: 67 IN | DIASTOLIC BLOOD PRESSURE: 58 MMHG | BODY MASS INDEX: 35.79 KG/M2 | RESPIRATION RATE: 18 BRPM

## 2018-09-17 LAB — RPR SER QL: NORMAL

## 2018-09-17 PROCEDURE — 99024 POSTOP FOLLOW-UP VISIT: CPT | Performed by: OBSTETRICS & GYNECOLOGY

## 2018-09-17 RX ORDER — DOCUSATE SODIUM 100 MG/1
100 CAPSULE, LIQUID FILLED ORAL 2 TIMES DAILY
Qty: 10 CAPSULE | Refills: 0 | Status: SHIPPED | OUTPATIENT
Start: 2018-09-17 | End: 2018-10-22

## 2018-09-17 RX ORDER — ACETAMINOPHEN 325 MG/1
650 TABLET ORAL EVERY 6 HOURS PRN
Qty: 30 TABLET | Refills: 0 | Status: SHIPPED | OUTPATIENT
Start: 2018-09-17 | End: 2018-10-22

## 2018-09-17 RX ORDER — IBUPROFEN 600 MG/1
600 TABLET ORAL EVERY 6 HOURS PRN
Qty: 30 TABLET | Refills: 0 | Status: SHIPPED | OUTPATIENT
Start: 2018-09-17 | End: 2018-10-22

## 2018-09-17 RX ORDER — DIAPER,BRIEF,INFANT-TODD,DISP
1 EACH MISCELLANEOUS 4 TIMES DAILY PRN
Qty: 30 G | Refills: 0 | Status: SHIPPED | OUTPATIENT
Start: 2018-09-17 | End: 2018-10-22

## 2018-09-17 RX ADMIN — IBUPROFEN 600 MG: 600 TABLET, FILM COATED ORAL at 05:59

## 2018-09-17 NOTE — NURSING NOTE
Provided pt with maternal and  discharge instructions  Offer translation via [employer] language line but pt declined  Pt's  provided translation  Pt verbalized understanding

## 2018-09-17 NOTE — PROGRESS NOTES
Progress Note - OB/GYN   Carol Adam 29 y o  female MRN: [id number]  Unit/Bed#: L&D 307-01 Encounter: [id number]    Assessment:  PP #2 s/p Spontaneous Vaginal Delivery, stable    Plan:  Contraception: for BTL, declines bridging methods  Continue routine postpartum care  Encourage ambulation  Encourage breastfeeding  Pain control as needed    Disposition: stable, anticipate discharge today    Subjective/Objective   Chief Complaint:     PP #2 s/p Spontaneous Vaginal Delivery    Subjective:     Pain: cramping, improved with motrin  Tolerating PO: yes  Voiding: yes  Flatus: yes  BM: yes  Ambulating: yes  Breastfeeding: Breastfeeding  Chest pain: no  Shortness of breath: no  Leg pain: no  Lochia: moderate     Objective:     Vitals:  Vitals:    09/16/18 0715 09/16/18 1157 09/16/18 1518 09/16/18 2307   BP: 115/76 107/72 129/81 114/75   BP Location:  Left arm Left arm Left arm   Pulse: 90 101 93 79   Resp: 18 16 18 18   Temp: 98 2 °F (36 8 °C) 98 4 °F (36 9 °C) 98 5 °F (36 9 °C) 97 8 °F (36 6 °C)   TempSrc: Oral Oral Oral Oral   SpO2: 98%      Weight:       Height:           Physical Exam:     Physical Exam   Constitutional: She is oriented to person, place, and time  She appears well-developed and well-nourished  No distress  Cardiovascular: Normal rate, regular rhythm, normal heart sounds and intact distal pulses  Pulmonary/Chest: Breath sounds normal  No respiratory distress  She has no wheezes  Abdominal: Soft  Bowel sounds are normal  She exhibits no distension  There is no tenderness  Neurological: She is alert and oriented to person, place, and time  Skin: She is not diaphoretic  Uterine fundus firm and non-tender, at the umbilicus       Lab, Imaging and other studies: I have personally reviewed pertinent reports        Lab Results   Component Value Date    WBC 7 66 09/15/2018    HGB 12 3 09/15/2018    HCT 36 6 09/15/2018    MCV 93 09/15/2018     09/15/2018               Landon Worrell DO  09/17/18

## 2018-09-17 NOTE — PLAN OF CARE

## 2018-09-17 NOTE — LACTATION NOTE
This note was copied from a baby's chart  Met with mother to go over feeding log since birth for the first week  FOB and interprets when mom does not understand  Emphasized 8 or more (12) feedings in a 24 hour period, what to expect for the number of diapers per day of life and the progression of properties of the  stooling pattern  Discussed s/s that breastfeeding is going well after day 4 and when to get help from a pediatrician or lactation support person after day 4  Booklet included Breast Pumping Instructions, When You Go Back to Work or School, and Breastfeeding Resources for after discharge including access to the number for the [facility]

## 2018-09-17 NOTE — SOCIAL WORK
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

## 2018-09-18 ENCOUNTER — TELEPHONE (OUTPATIENT)
Dept: OBGYN CLINIC | Facility: CLINIC | Age: 33
End: 2018-09-18

## 2018-09-18 NOTE — CASE MANAGEMENT
Notification of Maternity Inpatient Admission/Maternity Inpatient Authorization Request  This is a Notification of Maternity Inpatient Admission/Maternity Inpatient Authorization Request to our facility [address] [place] [address]  Please be advised that this patient is currently in our facility under Inpatient Status  Below you will find the Birth/[place]en Summary, Attending Physician and Facilitys information including NPI#  and contact information for the Utilization Review Department where the patient is receiving care services  Place of Service Code: 24   Place of Service Name: Inpatient Hospital  Presentation Date & Time: 9/15/2018  7:54 AM  Inpatient Admission Date & Time: 9/15/18 9444  Discharge Date & Time: 2018  3:28 PM   Discharge Disposition (if discharged): Home/Self Care  Attending Physician & NPI: Joan Dias, [address] Nguyen [[id number]]   [facility]  KACEY Lopez  Specialty- Obstetrics and Gynecology  [facility] ID- [id number]  [facility] "[employer]" [facility]   [employer], [address]  Phone 1: [phone]  Fax: [phone]  Type of Delivery: Diagnosis: [de-identified] VAGINAL DELIVERY  Estimated Date of Delivery: 18    Mother of  Information: Yoselin Yu   MRN: [id number] YOB: 1985   Information:   Information for the patient's :  Damaris Cook [[id number]]      Delivery Information:  Sex: female  Delivered 9/15/2018 2:55 PM by Vaginal, Spontaneous Delivery; Gestational Age: 36w2d    Holden Measurements:  Weight: 7 lb 9 oz (3430 g);   Height: 20"    APGAR 1 minute 5 minutes 10 minutes   Totals: 9 9      OB History      Para Term  AB Living    2 2 1     2    SAB TAB Ectopic Multiple Live Births          0 2        Facility: [address] [place] [address]  Address: Haresh Colindres, [facility], [address]  Phone: [phone] Tax ID: [id number]  NPI: [id number]  Medicare ID: [id number]  Thank you,  [employer]  Network Utilization Review Department  Phone: [phone]; Fax [phone]  ATTENTION: Please call with any questions or concerns to [phone]  and carefully follow the prompts so that you are directed to the right person  Send all requests for admission clinical reviews, approved or denied determinations and any other requests to fax [phone]   All voicemails are confidential

## 2018-09-18 NOTE — TELEPHONE ENCOUNTER
Transition of Care Post Partum phone call:       Congratulations  Vaginal bleeding status: no  Urinary status- urinating  Bowel movement: live  Date of delivery:9/15/2018   Weeks gestation: 40  Type of delivery: vaginal  Sex of child: female  Birth weight: 7lbs 9oz      Pain control: none    If breastfeeding: yes   Any baby blues:    Follow-up appointment scheduled today: yes

## 2018-09-21 NOTE — CASE MANAGEMENT
Megan Crow  Signed   Case Management Date of Service: 2018  3:28 PM         []Hide copied text  []Hover for attribution information  Notification of Maternity Inpatient Admission/Maternity Inpatient Authorization Request  This is a Notification of Maternity Inpatient Admission/Maternity Inpatient Authorization Request to our facility [facility]  Please be advised that this patient is currently in our facility under Inpatient Status  Below you will find the Birth/ Summary, Attending Physician and Facilitys information including NPI#  and contact information for the Utilization Review Department where the patient is receiving care services      Place of Service Code: 24   Place of Service Name: [address]  Presentation Date & Time: 9/15/2018  7:54 AM  Inpatient Admission Date & Time: 9/15/18 6188  Discharge Date & Time: 2018  3:28 PM   Discharge Disposition (if discharged): Home/Self Care  Attending Physician & NPI: Nataly Kang, Brannon Nguyen [[id number]]   KACEY Fernandez  Specialty- Obstetrics and Gynecology  [facility] ID- [id number]  [facility] [employer] [facility] [employer]  Phone 1: [phone]  Fax: [phone]  Type of Delivery: Diagnosis: [de-identified] VAGINAL DELIVERY  Estimated Date of Delivery: 18     Mother of [place] Information: Morena Chaney   MRN: [id number] YOB: 1985  [place] Information:   Information for the patient's :  Rebecca Fernandor [[id number]]      [place] Delivery Information:  Sex: female  Delivered 9/15/2018 2:55 PM by Vaginal, Spontaneous Delivery; Gestational Age: 36w2d     [place] Measurements:  Weight: 7 lb 9 oz (3430 g);   Height: 20"     APGAR 1 minute 5 minutes 10 minutes   Totals: 9 9                 OB History       Para Term  AB Living     2 2 1     2     SAB TAB Ectopic Multiple Live Births           0 2          Facility: [address] [place] [address] [place]  Address: [facility] Efrain Quinonez, [address] [facility]  Phone: [phone] Tax ID: [id number]  NPI: [id number]  Medicare ID: [id number]  Thank you,  [address] [place] [address] Utilization Review Department  Phone: [phone]; Fax [phone]  ATTENTION: Please call with any questions or concerns to [phone]  and carefully follow the prompts so that you are directed to the right person  Send all requests for admission clinical reviews, approved or denied determinations and any other requests to fax [phone]   All voicemails are confidential

## 2018-09-24 LAB — PLACENTA IN STORAGE: NORMAL

## 2018-10-10 ENCOUNTER — OFFICE VISIT (OUTPATIENT)
Dept: OBGYN CLINIC | Facility: CLINIC | Age: 33
End: 2018-10-10
Payer: COMMERCIAL

## 2018-10-10 VITALS
HEIGHT: 67 IN | SYSTOLIC BLOOD PRESSURE: 116 MMHG | BODY MASS INDEX: 33.27 KG/M2 | DIASTOLIC BLOOD PRESSURE: 78 MMHG | WEIGHT: 212 LBS

## 2018-10-10 PROCEDURE — 0503F POSTPARTUM CARE VISIT: CPT | Performed by: OBSTETRICS & GYNECOLOGY

## 2018-10-10 NOTE — PROGRESS NOTES
POSTPARTUM VISIT    Noelis Ulysses Rodriguez presents today for postpartum visit  She had a vaginal delivery on 09/15/18  Complications: none   She is bottle feeding her infant and reports no issues with such  She desires tubal for contraception  She was provided with Roena Brakeman Depression Screening tool and her score was 4  Review of Systems:   -Constitutional: denies issues, denies pain   -Breasts: denies tenderness   -Gynecologic: lochia    -Urinary: denies issues urinating   -GI: stools WNL, denies issues    Physical Exam:   -Vitals:   Vitals:    10/10/18 1343   BP: 116/78   Weight: 96 2 kg (212 lb)   Height: 5' 7" (1 702 m)      -General: A&Ox3, no acute distress noted   -Abdomen: soft, non-tender   -Extremities: nontender, no edema noted   -Pelvic exam:    External genitalia: nontender, no lesions or masses, perineum intact    Vagina: pink, rugae, no lesions/masses, normal discharge, one stitch noted    Uterus: nontender    Assessment/Plan:  1  Normal postpartum exam     Last pap smear was done 2/15/17 and result was negative  Advise return for next annual GYN exam in post-partum      Contraception: plans on tubal

## 2018-10-10 NOTE — PROGRESS NOTES
Assessment/Plan:     No problem-specific Assessment & Plan notes found for this encounter  There are no diagnoses linked to this encounter  Subjective:      Patient ID: Jose F Morales is a 29 y o  female      HPI    The following portions of the patient's history were reviewed and updated as appropriate: allergies, current medications, past family history, past medical history, past social history, past surgical history and problem list     Review of Systems      Objective:      /78   Ht 5' 7" (1 702 m)   Wt 96 2 kg (212 lb)   LMP 12/05/2017 (Exact Date)   BMI 33 20 kg/m²          Physical Exam

## 2018-10-16 PROBLEM — Z64.1 MULTIPARITY: Status: ACTIVE | Noted: 2018-10-16

## 2018-10-22 ENCOUNTER — OFFICE VISIT (OUTPATIENT)
Dept: OBGYN CLINIC | Facility: HOSPITAL | Age: 33
End: 2018-10-22
Payer: COMMERCIAL

## 2018-10-22 VITALS
HEART RATE: 66 BPM | DIASTOLIC BLOOD PRESSURE: 70 MMHG | SYSTOLIC BLOOD PRESSURE: 119 MMHG | BODY MASS INDEX: 33.12 KG/M2 | HEIGHT: 67 IN | WEIGHT: 211 LBS

## 2018-10-22 DIAGNOSIS — Z01.818 PRE-OPERATIVE EXAMINATION: Primary | ICD-10-CM

## 2018-10-22 PROCEDURE — 99213 OFFICE O/P EST LOW 20 MIN: CPT | Performed by: OBSTETRICS & GYNECOLOGY

## 2018-10-22 NOTE — PRE-PROCEDURE INSTRUCTIONS
Pre-Surgery Instructions:   Medication Instructions    acetaminophen (TYLENOL) 325 mg tablet Instructed patient per Anesthesia Guidelines   hydrocortisone 1 % cream Instructed patient per Anesthesia Guidelines   ibuprofen (MOTRIN) 600 mg tablet Instructed patient per Anesthesia Guidelines  Spoke to pt via translation line  Medication list reviewed & instructed  As of 10 22 18 pt to stop motrin  Instructed on tylenol only  Am DOS no meds  Showering instructions reviewed at time of call  All instructions verbally understood by patient  No further questions  Callback number given  Acetaminophen Med Class     Continue to take this medication on your normal schedule  If this is an oral medication and you take it in the morning, then you may take this medicine with a sip of water  NSAID Med Class     Stop taking this medication at least 3 days prior to surgery/procedure

## 2018-10-22 NOTE — PROGRESS NOTES
ASSESSMENT  29 y o  [id number] sexually active female presents with desire for permanent sterilization via laparoscopic bilateral salpingectomy  PLAN  1  H&P: completed  2  Pre-op labs on op day: CBC, type & screen  3  Consent to procedure or surgery sheet: completed  4  Pre-op Showering Instructions: info slip given to pt  5  NPO Instructions: info sheet given to pt:   6  laparoscopic bilateral salpingectomy on 10/26/18  7  SOD saw the pt in clinic today      SUBJECTIVE    EPIC:  Sexually active female presents with desire for permanent sterilization via laparoscopic bilateral salpingectomy  Multiparity desires permanent sterilization; Discussed lap salpingectomy vs vasectomy  Patient is aware of the risk of regret, and that the procedure is irreversible  She is also aware of all alternatives including but not limited to hormonal and non-hormal IUDs, Implanon, Depo, OCP's  Patient would still like to proceed with the procedure  She is aware of the risk of failure as well as subsequent ectopic pregnancy should she become pregnant  Patient desires lap bilateral salpingectomy; she declines bilateral partial salpingectomy as well as simple bilateral tube fulguration  Patient consented to the procedure  All questions were answered  Will continue to use current method of birth control until surgery  Procedure name: bilateral salpingectomy  Procedure date: 10/26/18  Indication for procedure: satisfied parity, desire for permanent sterilization   Age: 29 y o   Gs & Ps:   Current contraception: nothing  Gyn hx: Patient's last menstrual period was 2017 (exact date)  Pap result: 2018: NILM, neg HPV co-test  recent GC/CT:  neg/neg        TVUS findings: not available  EMB result: not available  CT findings: not available      Pt counseled on post-operative restrictions, including:    Post-Gynecologic Surgery Discharge Instructions    1   No heavy lifting more than one full gallon of milk (about 8 lbs)     2  Nothing in the vagina for 24 hours weeks  3  No tub baths or swimming for 2 weeks  Showering can start 48 hours after the surgery  4  You may take stairs one at a time, touching each step with both feet for the first few days, then as tolerated  5  Call the office for fever greater than 100  4'F, heavy vaginal bleeding, or increasing pain  6  Take Colace twice daily to keep your stool soft in order to prevent straining  7  No Driving until pain free and no longer requiring narcotic pain medications  8  Activity as tolerated  9  Post-operative pain control: pt will be given Rx for Ibuprofen 600mg for cramping and Percocet 5/325mg for moderate to severe pain  All questions answered by the patient were answered and consent was obtained  Review of Systems (min 10 pts):  Constitutional: Negative for chills and fever  Respiratory: Negative for chest tightness and shortness of breath  Cardiovascular: Negative for chest pain and palpitations  Gastrointestinal: Negative for abdominal pain and blood in stool  Genitourinary: Negative for dysuria and vaginal bleeding  Skin: Negative for rash and open wounds  Neurological: Negative for seizures and syncope  Past Medical History:   Diagnosis Date    Herpes     last outbreak was when she was pregnant in      Past Surgical History:   Procedure Laterality Date    NO PAST SURGERIES       OB History    Para Term  AB Living   2 2 1     2   SAB TAB Ectopic Multiple Live Births         0 2      # Outcome Date GA Lbr Dustin/2nd Weight Sex Delivery Anes PTL Lv   2 Term 09/15/18 40w4d 04:53 / 00:02 3430 g (7 lb 9 oz) F Vag-Spont Local N ZULMA   1 Para 08/02/15 40w0d  3600 g (7 lb 15 oz) F Vag-Spont None  ZULMA         reports that she has never smoked  She has never used smokeless tobacco  She reports that she does not drink alcohol or use drugs    Immunization History   Administered Date(s) Administered    Influenza Quadrivalent Preservative Free 3 years and older IM 02/02/2018    Influenza TIV (IM) 12/24/2014    Tdap 06/21/2018     No Known Allergies  Patient's Medications   New Prescriptions    No medications on file   Previous Medications    No medications on file   Modified Medications    No medications on file   Discontinued Medications    ACETAMINOPHEN (TYLENOL) 325 MG TABLET    Take 2 tablets (650 mg total) by mouth every 6 (six) hours as needed for headaches    HYDROCORTISONE 1 % CREAM    Apply 1 application topically 4 (four) times a day as needed for irritation or rash    IBUPROFEN (MOTRIN) 600 MG TABLET    Take 1 tablet (600 mg total) by mouth every 6 (six) hours as needed for mild pain       OBJECTIVE      Physical exam (4x4):   General: No Acute Distress   Cardiovascular: Regular Rate and Rhythm, no rubs, no murmurs, no gallops   Lungs: Clear to Auscultation Bilaterally, no wheezing, rhonchi or rales   Lower Extremities: skin intact, negative Morris's sign bilaterally,    Neuro: Alert, cooperative     Abdomen:    Rebound: no   Guarding: no   RLQ tenderness to palpation: no   RUQ tenderness to palpation: no   LLQ tenderness to palpation: no   LUQ tenderness to palpation: no    Genitourinary:      Inspection:     Labia minora:       Atrophy: no     Erythema: no     Exudate: no     Discharge: no     Warts: no     Rash: no     Pelvic exam:    Suprapubic tenderness to palpation: no    Cervical motion tenderness: no    Uterus:      fundus tender to palpation: no     position: anteverted      mobility: mobile      size: unremarkable for post-partum period     contour: smooth      Adnexae    L:     Size: limited by habitus     Tender to palpation: no     Palpable mass(es): no    R:     Size:  limited by habitus     Tender to palpation: no     Palpable mass(es): no

## 2018-10-25 ENCOUNTER — ANESTHESIA EVENT (OUTPATIENT)
Dept: PERIOP | Facility: HOSPITAL | Age: 33
End: 2018-10-25
Payer: COMMERCIAL

## 2018-10-25 NOTE — ANESTHESIA PREPROCEDURE EVALUATION
Review of Systems/Medical History  Patient summary reviewed  Chart reviewed  No history of anesthetic complications     Cardiovascular  Negative cardio ROS    Pulmonary  Negative pulmonary ROS        GI/Hepatic  Negative GI/hepatic ROS          Negative  ROS        Endo/Other    Comment: Hx genital herpes Obesity (BMI 33)    GYN       Hematology  Negative hematology ROS      Musculoskeletal  Negative musculoskeletal ROS        Neurology  Negative neurology ROS      Psychology   Negative psychology ROS              Physical Exam    Airway    Mallampati score: II  TM Distance: >3 FB       Dental   No notable dental hx     Cardiovascular  Comment: Negative ROS, Rhythm: regular,     Pulmonary  Breath sounds clear to auscultation,     Other Findings        Anesthesia Plan  ASA Score- 2     Anesthesia Type- general with ASA Monitors  Additional Monitors:   Airway Plan: ETT  Comment: Yoruba speaking only  Anesthesia translated to pt via spouse  Plan Factors-    Induction- intravenous  Postoperative Plan- Plan for postoperative opioid use  Planned trial extubation    Informed Consent- Anesthetic plan and risks discussed with patient and spouse  I personally reviewed this patient with the CRNA  Discussed and agreed on the Anesthesia Plan with the MANASA Sinclair

## 2018-10-26 ENCOUNTER — HOSPITAL ENCOUNTER (OUTPATIENT)
Facility: HOSPITAL | Age: 33
Setting detail: OUTPATIENT SURGERY
Discharge: HOME | End: 2018-10-26
Attending: OBSTETRICS & GYNECOLOGY | Admitting: OBSTETRICS & GYNECOLOGY
Payer: COMMERCIAL

## 2018-10-26 ENCOUNTER — ANESTHESIA (OUTPATIENT)
Dept: PERIOP | Facility: HOSPITAL | Age: 33
End: 2018-10-26
Payer: COMMERCIAL

## 2018-10-26 VITALS
OXYGEN SATURATION: 100 % | RESPIRATION RATE: 18 BRPM | HEIGHT: 67 IN | BODY MASS INDEX: 32.96 KG/M2 | HEART RATE: 95 BPM | SYSTOLIC BLOOD PRESSURE: 109 MMHG | DIASTOLIC BLOOD PRESSURE: 77 MMHG | WEIGHT: 210 LBS | TEMPERATURE: 100 F

## 2018-10-26 DIAGNOSIS — Z64.1 MULTIPARITY: ICD-10-CM

## 2018-10-26 PROBLEM — Z30.2 ENCOUNTER FOR STERILIZATION: Status: ACTIVE | Noted: 2018-10-26

## 2018-10-26 LAB
ABO GROUP BLD: NORMAL
BLD GP AB SCN SERPL QL: NEGATIVE
EXT PREGNANCY TEST URINE: NEGATIVE
RH BLD: POSITIVE
SPECIMEN EXPIRATION DATE: NORMAL

## 2018-10-26 PROCEDURE — 81025 URINE PREGNANCY TEST: CPT | Performed by: ANESTHESIOLOGY

## 2018-10-26 PROCEDURE — 86850 RBC ANTIBODY SCREEN: CPT | Performed by: OBSTETRICS & GYNECOLOGY

## 2018-10-26 PROCEDURE — 86900 BLOOD TYPING SEROLOGIC ABO: CPT | Performed by: OBSTETRICS & GYNECOLOGY

## 2018-10-26 PROCEDURE — 86901 BLOOD TYPING SEROLOGIC RH(D): CPT | Performed by: OBSTETRICS & GYNECOLOGY

## 2018-10-26 PROCEDURE — 88302 TISSUE EXAM BY PATHOLOGIST: CPT | Performed by: PATHOLOGY

## 2018-10-26 PROCEDURE — 58661 LAPAROSCOPY REMOVE ADNEXA: CPT | Performed by: OBSTETRICS & GYNECOLOGY

## 2018-10-26 RX ORDER — OXYCODONE HYDROCHLORIDE AND ACETAMINOPHEN 5; 325 MG/1; MG/1
1 TABLET ORAL EVERY 4 HOURS PRN
Status: CANCELLED | OUTPATIENT
Start: 2018-10-26

## 2018-10-26 RX ORDER — SUCCINYLCHOLINE/SOD CL,ISO/PF 100 MG/5ML
SYRINGE (ML) INTRAVENOUS AS NEEDED
Status: DISCONTINUED | OUTPATIENT
Start: 2018-10-26 | End: 2018-10-26 | Stop reason: SURG

## 2018-10-26 RX ORDER — ROCURONIUM BROMIDE 10 MG/ML
INJECTION, SOLUTION INTRAVENOUS AS NEEDED
Status: DISCONTINUED | OUTPATIENT
Start: 2018-10-26 | End: 2018-10-26 | Stop reason: SURG

## 2018-10-26 RX ORDER — FENTANYL CITRATE/PF 50 MCG/ML
50 SYRINGE (ML) INJECTION
Status: DISCONTINUED | OUTPATIENT
Start: 2018-10-26 | End: 2018-10-26 | Stop reason: HOSPADM

## 2018-10-26 RX ORDER — KETOROLAC TROMETHAMINE 30 MG/ML
INJECTION, SOLUTION INTRAMUSCULAR; INTRAVENOUS AS NEEDED
Status: DISCONTINUED | OUTPATIENT
Start: 2018-10-26 | End: 2018-10-26 | Stop reason: SURG

## 2018-10-26 RX ORDER — ONDANSETRON 2 MG/ML
INJECTION INTRAMUSCULAR; INTRAVENOUS AS NEEDED
Status: DISCONTINUED | OUTPATIENT
Start: 2018-10-26 | End: 2018-10-26 | Stop reason: SURG

## 2018-10-26 RX ORDER — GLYCOPYRROLATE 0.2 MG/ML
INJECTION INTRAMUSCULAR; INTRAVENOUS AS NEEDED
Status: DISCONTINUED | OUTPATIENT
Start: 2018-10-26 | End: 2018-10-26 | Stop reason: SURG

## 2018-10-26 RX ORDER — METOCLOPRAMIDE HYDROCHLORIDE 5 MG/ML
10 INJECTION INTRAMUSCULAR; INTRAVENOUS ONCE AS NEEDED
Status: DISCONTINUED | OUTPATIENT
Start: 2018-10-26 | End: 2018-10-26 | Stop reason: HOSPADM

## 2018-10-26 RX ORDER — ACETAMINOPHEN 325 MG/1
650 TABLET ORAL EVERY 6 HOURS PRN
Status: CANCELLED | OUTPATIENT
Start: 2018-10-26

## 2018-10-26 RX ORDER — SODIUM CHLORIDE, SODIUM LACTATE, POTASSIUM CHLORIDE, CALCIUM CHLORIDE 600; 310; 30; 20 MG/100ML; MG/100ML; MG/100ML; MG/100ML
50 INJECTION, SOLUTION INTRAVENOUS CONTINUOUS
Status: DISCONTINUED | OUTPATIENT
Start: 2018-10-26 | End: 2018-10-26 | Stop reason: HOSPADM

## 2018-10-26 RX ORDER — LIDOCAINE HYDROCHLORIDE 10 MG/ML
0.5 INJECTION, SOLUTION INFILTRATION; PERINEURAL ONCE AS NEEDED
Status: DISCONTINUED | OUTPATIENT
Start: 2018-10-26 | End: 2018-10-26 | Stop reason: HOSPADM

## 2018-10-26 RX ORDER — ONDANSETRON 2 MG/ML
4 INJECTION INTRAMUSCULAR; INTRAVENOUS ONCE AS NEEDED
Status: DISCONTINUED | OUTPATIENT
Start: 2018-10-26 | End: 2018-10-26 | Stop reason: HOSPADM

## 2018-10-26 RX ORDER — OXYCODONE HYDROCHLORIDE AND ACETAMINOPHEN 5; 325 MG/1; MG/1
2 TABLET ORAL EVERY 4 HOURS PRN
Status: CANCELLED | OUTPATIENT
Start: 2018-10-26

## 2018-10-26 RX ORDER — HYDROMORPHONE HCL/PF 1 MG/ML
0.2 SYRINGE (ML) INJECTION
Status: DISCONTINUED | OUTPATIENT
Start: 2018-10-26 | End: 2018-10-26 | Stop reason: HOSPADM

## 2018-10-26 RX ORDER — SODIUM CHLORIDE 9 MG/ML
125 INJECTION, SOLUTION INTRAVENOUS CONTINUOUS
Status: DISCONTINUED | OUTPATIENT
Start: 2018-10-26 | End: 2018-10-26 | Stop reason: HOSPADM

## 2018-10-26 RX ORDER — BUPIVACAINE HYDROCHLORIDE 2.5 MG/ML
INJECTION, SOLUTION INFILTRATION; PERINEURAL AS NEEDED
Status: DISCONTINUED | OUTPATIENT
Start: 2018-10-26 | End: 2018-10-26 | Stop reason: HOSPADM

## 2018-10-26 RX ORDER — LIDOCAINE HYDROCHLORIDE 10 MG/ML
INJECTION, SOLUTION INFILTRATION; PERINEURAL AS NEEDED
Status: DISCONTINUED | OUTPATIENT
Start: 2018-10-26 | End: 2018-10-26 | Stop reason: SURG

## 2018-10-26 RX ORDER — FENTANYL CITRATE 50 UG/ML
INJECTION, SOLUTION INTRAMUSCULAR; INTRAVENOUS AS NEEDED
Status: DISCONTINUED | OUTPATIENT
Start: 2018-10-26 | End: 2018-10-26 | Stop reason: SURG

## 2018-10-26 RX ORDER — OXYCODONE HYDROCHLORIDE AND ACETAMINOPHEN 5; 325 MG/1; MG/1
1 TABLET ORAL EVERY 6 HOURS PRN
Qty: 15 TABLET | Refills: 0 | Status: SHIPPED | OUTPATIENT
Start: 2018-10-26 | End: 2018-11-05

## 2018-10-26 RX ORDER — IBUPROFEN 600 MG/1
600 TABLET ORAL EVERY 6 HOURS PRN
Status: CANCELLED | OUTPATIENT
Start: 2018-10-26

## 2018-10-26 RX ORDER — ONDANSETRON 2 MG/ML
4 INJECTION INTRAMUSCULAR; INTRAVENOUS EVERY 6 HOURS PRN
Status: DISCONTINUED | OUTPATIENT
Start: 2018-10-26 | End: 2018-10-26 | Stop reason: HOSPADM

## 2018-10-26 RX ORDER — PROPOFOL 10 MG/ML
INJECTION, EMULSION INTRAVENOUS AS NEEDED
Status: DISCONTINUED | OUTPATIENT
Start: 2018-10-26 | End: 2018-10-26 | Stop reason: SURG

## 2018-10-26 RX ORDER — SODIUM CHLORIDE, SODIUM LACTATE, POTASSIUM CHLORIDE, CALCIUM CHLORIDE 600; 310; 30; 20 MG/100ML; MG/100ML; MG/100ML; MG/100ML
125 INJECTION, SOLUTION INTRAVENOUS CONTINUOUS
Status: DISCONTINUED | OUTPATIENT
Start: 2018-10-26 | End: 2018-10-26 | Stop reason: HOSPADM

## 2018-10-26 RX ORDER — MEPERIDINE HYDROCHLORIDE 25 MG/ML
12.5 INJECTION INTRAMUSCULAR; INTRAVENOUS; SUBCUTANEOUS ONCE AS NEEDED
Status: DISCONTINUED | OUTPATIENT
Start: 2018-10-26 | End: 2018-10-26 | Stop reason: HOSPADM

## 2018-10-26 RX ADMIN — LIDOCAINE HYDROCHLORIDE 50 MG: 10 INJECTION, SOLUTION INFILTRATION; PERINEURAL at 16:35

## 2018-10-26 RX ADMIN — FENTANYL CITRATE 100 MCG: 50 INJECTION, SOLUTION INTRAMUSCULAR; INTRAVENOUS at 16:35

## 2018-10-26 RX ADMIN — KETOROLAC TROMETHAMINE 30 MG: 30 INJECTION, SOLUTION INTRAMUSCULAR at 17:19

## 2018-10-26 RX ADMIN — SODIUM CHLORIDE, SODIUM LACTATE, POTASSIUM CHLORIDE, AND CALCIUM CHLORIDE 125 ML/HR: .6; .31; .03; .02 INJECTION, SOLUTION INTRAVENOUS at 13:51

## 2018-10-26 RX ADMIN — ROCURONIUM BROMIDE 20 MG: 10 INJECTION INTRAVENOUS at 16:40

## 2018-10-26 RX ADMIN — SODIUM CHLORIDE, SODIUM LACTATE, POTASSIUM CHLORIDE, AND CALCIUM CHLORIDE: .6; .31; .03; .02 INJECTION, SOLUTION INTRAVENOUS at 16:35

## 2018-10-26 RX ADMIN — FENTANYL CITRATE 50 MCG: 50 INJECTION, SOLUTION INTRAMUSCULAR; INTRAVENOUS at 17:20

## 2018-10-26 RX ADMIN — NEOSTIGMINE METHYLSULFATE 2 MG: 1 INJECTION, SOLUTION INTRAMUSCULAR; INTRAVENOUS; SUBCUTANEOUS at 17:20

## 2018-10-26 RX ADMIN — FENTANYL CITRATE 50 MCG: 50 INJECTION, SOLUTION INTRAMUSCULAR; INTRAVENOUS at 16:50

## 2018-10-26 RX ADMIN — DEXAMETHASONE SODIUM PHOSPHATE 10 MG: 10 INJECTION INTRAMUSCULAR; INTRAVENOUS at 16:35

## 2018-10-26 RX ADMIN — Medication 100 MG: at 16:35

## 2018-10-26 RX ADMIN — ONDANSETRON 4 MG: 2 INJECTION INTRAMUSCULAR; INTRAVENOUS at 16:35

## 2018-10-26 RX ADMIN — GLYCOPYRROLATE 0.4 MG: 0.2 INJECTION, SOLUTION INTRAMUSCULAR; INTRAVENOUS at 17:20

## 2018-10-26 RX ADMIN — PROPOFOL 200 MG: 10 INJECTION, EMULSION INTRAVENOUS at 16:35

## 2018-10-26 NOTE — OP NOTE
OPERATIVE REPORT  PATIENT NAME: Lisbet Steinberg    :  1985  MRN: [id number]  Pt Location:  OR ROOM 03    SURGERY DATE: 10/26/2018    Surgeon(s) and Role:     * Randolph Suarez MD - Primary     * Jeanie Tinoco MD - Mathew Torres MD - Assisting    Preop Diagnosis:  Multiparity [Z64 1]    Post-Op Diagnosis Codes:     * Multiparity [Z64 1]    Procedure(s) (LRB):  LAPAROSCOPIC SALPINGECTOMY (Bilateral)    Specimen(s):  ID Type Source Tests Collected by Time Destination   1 :  Tissue Fallopian Tubes, Bilateral TISSUE EXAM Randolph Suarez MD 10/26/2018 1707      Estimated Blood Loss:   Minimal    Drains:  [REMOVED] Urethral Catheter Non-latex 16 Fr  (Removed)   Number of days: 0       Anesthesia Type:   General ETT    Operative Indications:  Multiparity [Z64 1]  Request for sterilization    Operative Findings:  Normal appearing ovaries bilaterally  Normal appearing fallopian tubes bilaterally  Good hemostasis at salpingectomy sites at conclusion of procedure    Complications:   None    Procedure and Technique:  Patient was taken to the operating room were a time out was performed to confirm correct patient and correct procedure  General endotracheal anesthesia (GET) was administered and the patient was positioned on the OR table in the dorsal lithotomy position  All pressure points were padded and a mt hugger was placed to maintain control of core body temperature  A bimanual exam was performed and the uterus was noted to be anteverted, normal in size and consistency with no palpable adnexal masses or fullness  The patient was prepped and draped in the usual sterile fashion with chloroprep on the abdomen and betadine prep on the vagina and perineum  A straight catheter was introduced into the bladder, which was drained of 150cc of clear yellow urine   A weighted speculum and right angle retractor were inserted into the vagina and used to visualize the anterior lip of the cervix, which was then grasped with a single toothed tenaculum  A Barclay dilator was inserted into the cervix and secured to the tenaculum  The speculum and retractor were removed from the vagina  Sterile gloves were then exchanged and attention was turned to the abdomen  A 5mm incision was made at the inferior edge of the umbilicus for introduction of a 5mm trocar  Trocar was introduced under direct visualization  Pneumoperitoneum was then established to a maximum of 15mmHg  The entire abdomen and pelvis was inspected and there was no evidence of injury to bowel, bladder, vasculature, or other structures  Attention was then turned to the pelvis  Patient was placed in Trendelenburg position and the uterus was elevated to visualize the fallopian tubes  There was noted to be grossly normal tubes and ovaries bilaterally  Two additional port sites were selected in the left and right lower abdomen approximately 2cm superior and medial to the anterior superior iliac spine  A 5mm incision was made for introduction of a 5mm trocar under direct visualization at each site  The left fallopian tube was grasped at its fimbriated end with a blunt grasper and elevated to visualize the mesosalpinx  Enseal device was used to fulgurate and cut along the mesosalpinx, working proximally and taking care to avoid ovarian vasculature  The Enseal device was used to amputate fallopian tube  This was then withdrawn from the abdominal cavity and sent for pathology  Attention was then turned to the contralateral tube, which was amputated in similar fashion  Good hemostasis was confirmed following salpingectomy  Following bilateral salpingectomy, pneumoperitoneum was allowed to escape  Adequate hemostasis was visualized  The inferior trocars were removed under direct visualization  The laparoscope was withdrawn from the abdomen, followed by its trocar sleeve at the umbilicus   Skin incisions were closed with running absorbable suture of 4-0 monocryl  Attention was turned to the vagina  A weighted speculum was reinserted into the vagina and the uterine manipulator was withdrawn  Single toothed tenaculum was removed from the anterior lip of the cervix  Good hemostasis was confirmed at the tenaculum puncture sites  Speculum was then removed from the vagina  At the conclusion of the procedure, all needle, sponge, and instrument counts were noted to be correct x2  Patient tolerated the procedure well and was transferred to PACU in stable condition prior to discharge with follow up in 1-2 weeks  Dr Petra Sácnhez was present and participated in all key portions of the case      Patient Disposition:  PACU     DATE: October 26, 2018  TIME: 5:51 PM

## 2018-10-26 NOTE — PROGRESS NOTES
Pt's  Ciro Michaud at bedside  Noted pt's   picked up pt's prescription at [facility]

## 2018-10-26 NOTE — ANESTHESIA POSTPROCEDURE EVALUATION
Post-Op Assessment Note      CV Status:  Stable    Mental Status:  Alert and awake    Hydration Status:  Euvolemic    PONV Controlled:  Controlled    Airway Patency:  Patent    Post Op Vitals Reviewed: Yes          Staff: CRNA       Comments: report to RN, 123/61, 70, 16, 98% ORA            BP      Temp     Pulse     Resp      SpO2

## 2018-10-26 NOTE — DISCHARGE INSTRUCTIONS
Salpingectomía   LO QUE NECESITA SABER:   Juan C salpingectomía es [place] para remover juan c o ambas trompas de falopio  Las trompas de falopio transportan óvulos de los ovarios al [place]  Estas son parte del sistema reproductivo de la james  Juan C salpingectomía podría realizarse para tratar un embarazo ectópico, cáncer, endometriosis o juan c infección  También podría realizarse para evitar un embarazo o algunos tipos de cáncer  INSTRUCCIONES SOBRE EL [facility] HOSPITALARIA:   Llame al 911 en shayne de presentar lo siguiente:   · Usted se siente mareada, le hace falta el aire y tiene dolor de [place]  · Usted expectora wayne  · Usted tiene dificultad para respirar  Busque atención médica de inmediato si:   · Harman brazo o pierna se siente caliente, sensible y [place]  Se podría jason inflamado y tirado  · La wayne empapa el vendaje  · Se desprenden los puntos de sutura  · Usted empapa 1 toalla sanitaria en 1 hora  · Usted tiene dificultad para orinar o no puede orinar en lo absoluto  Pregúntele a harman Glory Rinne vitaminas y minerales son adecuados para usted  · Usted tiene fiebre o escalofríos  · Harman herida está mark, inflamada o drena pus  · Harman vagina supura pus o huele mal      · Harman dolor no desaparece después de que usted se saida el medicamento  · Usted tiene náuseas o está vomitando  · Usted tiene comezón en la piel, inflamación o un sarpullido  · Usted tiene preguntas o inquietudes acerca de harman condición o cuidado  Medicamentos:  Es posible que usted necesite alguno de los siguientes:  · Un medicamento con receta para el dolor  podrían ser Elnor Keel  Pregunte al médico cómo debe newton ese medicamento de forma jay  · AINEs (Analgésicos antiinflamatorios no esteroides) lily el ibuprofeno, ayudan a disminuir la inflamación, el dolor y la [place]  Los AINEs pueden causar sangrado estomacal o problemas renales en ciertas personas   Si usted saida un medicamento anticoagulante, siempre pregúntele a harman médico si los AMELIA son seguros para usted  Siempre evie la etiqueta de ese medicamento y [place] instrucciones  · [place] colin medicamentos lily se le haya indicado  Consulte con harman médico si usted macie que harman medicamento no le está ayudando o si presenta efectos secundarios  Infórmele si es alérgico a cualquier medicamento  Mantenga juan c lista actualizada de los [place], las vitaminas y los productos herbales que saida  Incluya los siguientes datos de los medicamentos: cantidad, frecuencia y motivo de administración  Traiga con usted la lista o los envases de la píldoras a colin citas de seguimiento  Lleve la lista de los medicamentos con usted en shayne de juan c emergencia  Siga las instrucciones de harman médico sobre el cuidado de colin heridas:  Pregunte a harman médico cuándo se puede mojar la herida  No se bañe en la gela hasta que harman médico se lo autorice  Solo tome [employer]  Lave cuidadosamente el área alrededor de la herida con [place] y [place]  Deje que [place] García y el agua corran suavemente sobre harman incisión  No  frote la incisión  Seque el área y póngale vendajes nuevos y limpios lily le indicaron  Cambie colin vendajes cuando se mojen o ensucien  Si usted tiene tiras de [place] [address], deje que se caigan solas  Actividad:  Pregunte a harman médico cuándo puede retomar colin actividades habituales  No tome duchas vaginales, ni use tampones o tenga relaciones sexuales hasta que harman médico se lo autorice  Estas actividades podrían provocarle juan c infección  No dieter ejercicio ni levante nada pesado hasta que harman médico se lo autorice  [place] podría poner demasiada presión en harman incisión  Acuda a colin consultas de control con harman médico según le indicaron  Anote colin preguntas para que se acuerde de hacerlas aditya colin visitas  © 2017 [address] Information is for End User's use only and may not be sold, redistributed or otherwise used for commercial purposes   All illustrations and images included in [employer] are the copyrighted property of A D A M , Inc  or Doug Ospina  Esta información es sólo para uso en educación  Harman intención no es darle un consejo médico sobre enfermedades o tratamientos  Colsulte con harman [place] farmacéutico antes de seguir cualquier régimen médico para saber si es seguro y efectivo para usted

## 2018-10-26 NOTE — H&P (VIEW-ONLY)
ASSESSMENT  29 y o  [id number] sexually active female presents with desire for permanent sterilization via laparoscopic bilateral salpingectomy  PLAN  1  H&P: completed  2  Pre-op labs on op day: CBC, type & screen  3  Consent to procedure or surgery sheet: completed  4  Pre-op Showering Instructions: info slip given to pt  5  NPO Instructions: info sheet given to pt:   6  laparoscopic bilateral salpingectomy on 10/26/18  7  SOD saw the pt in clinic today      SUBJECTIVE    EPIC:  Sexually active female presents with desire for permanent sterilization via laparoscopic bilateral salpingectomy  Multiparity desires permanent sterilization; Discussed lap salpingectomy vs vasectomy  Patient is aware of the risk of regret, and that the procedure is irreversible  She is also aware of all alternatives including but not limited to hormonal and non-hormal IUDs, Implanon, Depo, OCP's  Patient would still like to proceed with the procedure  She is aware of the risk of failure as well as subsequent ectopic pregnancy should she become pregnant  Patient desires lap bilateral salpingectomy; she declines bilateral partial salpingectomy as well as simple bilateral tube fulguration  Patient consented to the procedure  All questions were answered  Will continue to use current method of birth control until surgery  Procedure name: bilateral salpingectomy  Procedure date: 10/26/18  Indication for procedure: satisfied parity, desire for permanent sterilization   Age: 29 y o   Gs & Ps:   Current contraception: nothing  Gyn hx: Patient's last menstrual period was 2017 (exact date)  Pap result: 2018: NILM, neg HPV co-test  recent GC/CT:  neg/neg        TVUS findings: not available  EMB result: not available  CT findings: not available      Pt counseled on post-operative restrictions, including:    Post-Gynecologic Surgery Discharge Instructions    1   No heavy lifting more than one full gallon of milk (about 8 lbs)     2  Nothing in the vagina for 24 hours weeks  3  No tub baths or swimming for 2 weeks  Showering can start 48 hours after the surgery  4  You may take stairs one at a time, touching each step with both feet for the first few days, then as tolerated  5  Call the office for fever greater than 100  4'F, heavy vaginal bleeding, or increasing pain  6  Take Colace twice daily to keep your stool soft in order to prevent straining  7  No Driving until pain free and no longer requiring narcotic pain medications  8  Activity as tolerated  9  Post-operative pain control: pt will be given Rx for Ibuprofen 600mg for cramping and Percocet 5/325mg for moderate to severe pain  All questions answered by the patient were answered and consent was obtained  Review of Systems (min 10 pts):  Constitutional: Negative for chills and fever  Respiratory: Negative for chest tightness and shortness of breath  Cardiovascular: Negative for chest pain and palpitations  Gastrointestinal: Negative for abdominal pain and blood in stool  Genitourinary: Negative for dysuria and vaginal bleeding  Skin: Negative for rash and open wounds  Neurological: Negative for seizures and syncope  Past Medical History:   Diagnosis Date    Herpes     last outbreak was when she was pregnant in      Past Surgical History:   Procedure Laterality Date    NO PAST SURGERIES       OB History    Para Term  AB Living   2 2 1     2   SAB TAB Ectopic Multiple Live Births         0 2      # Outcome Date GA Lbr Dustin/2nd Weight Sex Delivery Anes PTL Lv   2 Term 09/15/18 40w4d 04:53 / 00:02 3430 g (7 lb 9 oz) F Vag-Spont Local N ZULMA   1 Para 08/02/15 40w0d  3600 g (7 lb 15 oz) F Vag-Spont None  ZULMA         reports that she has never smoked  She has never used smokeless tobacco  She reports that she does not drink alcohol or use drugs    Immunization History   Administered Date(s) Administered    Influenza Quadrivalent Preservative Free 3 years and older IM 02/02/2018    Influenza TIV (IM) 12/24/2014    Tdap 06/21/2018     No Known Allergies  Patient's Medications   New Prescriptions    No medications on file   Previous Medications    No medications on file   Modified Medications    No medications on file   Discontinued Medications    ACETAMINOPHEN (TYLENOL) 325 MG TABLET    Take 2 tablets (650 mg total) by mouth every 6 (six) hours as needed for headaches    HYDROCORTISONE 1 % CREAM    Apply 1 application topically 4 (four) times a day as needed for irritation or rash    IBUPROFEN (MOTRIN) 600 MG TABLET    Take 1 tablet (600 mg total) by mouth every 6 (six) hours as needed for mild pain       OBJECTIVE      Physical exam (4x4):   General: No Acute Distress   Cardiovascular: Regular Rate and Rhythm, no rubs, no murmurs, no gallops   Lungs: Clear to Auscultation Bilaterally, no wheezing, rhonchi or rales   Lower Extremities: skin intact, negative Morris's sign bilaterally,    Neuro: Alert, cooperative     Abdomen:    Rebound: no   Guarding: no   RLQ tenderness to palpation: no   RUQ tenderness to palpation: no   LLQ tenderness to palpation: no   LUQ tenderness to palpation: no    Genitourinary:      Inspection:     Labia minora:       Atrophy: no     Erythema: no     Exudate: no     Discharge: no     Warts: no     Rash: no     Pelvic exam:    Suprapubic tenderness to palpation: no    Cervical motion tenderness: no    Uterus:      fundus tender to palpation: no     position: anteverted      mobility: mobile      size: unremarkable for post-partum period     contour: smooth      Adnexae    L:     Size: limited by habitus     Tender to palpation: no     Palpable mass(es): no    R:     Size:  limited by habitus     Tender to palpation: no     Palpable mass(es): no

## 2018-11-08 ENCOUNTER — OFFICE VISIT (OUTPATIENT)
Dept: OBGYN CLINIC | Facility: CLINIC | Age: 33
End: 2018-11-08
Payer: COMMERCIAL

## 2018-11-08 VITALS
HEART RATE: 71 BPM | BODY MASS INDEX: 32.96 KG/M2 | WEIGHT: 210 LBS | SYSTOLIC BLOOD PRESSURE: 117 MMHG | DIASTOLIC BLOOD PRESSURE: 78 MMHG | HEIGHT: 67 IN

## 2018-11-08 DIAGNOSIS — Z90.79 STATUS POST BILATERAL SALPINGECTOMY: Primary | ICD-10-CM

## 2018-11-08 PROCEDURE — 99214 OFFICE O/P EST MOD 30 MIN: CPT | Performed by: OBSTETRICS & GYNECOLOGY

## 2018-11-08 NOTE — PROGRESS NOTES
Subjective:     Carol Sawyer is a 34 y o   who is POD#13 s/p bilateral Laparoscopic salpingectomy  Patient is doing well  She has no complaints  Objective:    Vitals: Blood pressure 117/78, pulse 71, height 5' 7" (1 702 m), weight 95 3 kg (210 lb), last menstrual period 2017, currently breastfeeding  Body mass index is 32 89 kg/m²  Physical Exam   Abdominal: Soft  Bowel sounds are normal  She exhibits no distension  There is no tenderness  Incisions are clean, dry and intact     Assessment/Plan:  Carol Sawyer is a 34 y o   who is POD#13 s/p bilateral Laparoscopic salpingectomy  Patient is doing well  She has no complaints     - Return to clinic for annual exam     Cornel Rosa MD  2018  1:15 PM

## 2020-07-09 DIAGNOSIS — U07.1 COVID-19: ICD-10-CM

## 2020-07-09 PROCEDURE — U0003 INFECTIOUS AGENT DETECTION BY NUCLEIC ACID (DNA OR RNA); SEVERE ACUTE RESPIRATORY SYNDROME CORONAVIRUS 2 (SARS-COV-2) (CORONAVIRUS DISEASE [COVID-19]), AMPLIFIED PROBE TECHNIQUE, MAKING USE OF HIGH THROUGHPUT TECHNOLOGIES AS DESCRIBED BY CMS-2020-01-R: HCPCS

## 2020-07-15 LAB — SARS-COV-2 RNA SPEC QL NAA+PROBE: NOT DETECTED

## 2020-07-16 ENCOUNTER — TELEPHONE (OUTPATIENT)
Dept: OTHER | Facility: OTHER | Age: 35
End: 2020-07-16

## 2020-07-16 NOTE — TELEPHONE ENCOUNTER
Carol' test for COVID-19, also known as novel coronavirus, came back negative  She does not have COVID-19  If you have any additional questions, we can schedule a virtual visit for you with a provider or call the [facility] [phone] Option 7 for care advice  For additional information , please visit the Coronavirus FAQ on the [address] [facility] ([facility])   denied having any questions

## 2021-04-09 DIAGNOSIS — Z23 ENCOUNTER FOR IMMUNIZATION: ICD-10-CM

## 2021-04-19 ENCOUNTER — IMMUNIZATIONS (OUTPATIENT)
Dept: FAMILY MEDICINE CLINIC | Facility: HOSPITAL | Age: 36
End: 2021-04-19

## 2021-04-19 DIAGNOSIS — Z23 ENCOUNTER FOR IMMUNIZATION: Primary | ICD-10-CM

## 2021-04-19 PROCEDURE — 0011A SARS-COV-2 / COVID-19 MRNA VACCINE (MODERNA) 100 MCG: CPT

## 2021-04-19 PROCEDURE — 91301 SARS-COV-2 / COVID-19 MRNA VACCINE (MODERNA) 100 MCG: CPT

## 2021-05-17 ENCOUNTER — IMMUNIZATIONS (OUTPATIENT)
Dept: FAMILY MEDICINE CLINIC | Facility: HOSPITAL | Age: 36
End: 2021-05-17

## 2021-05-17 DIAGNOSIS — Z23 ENCOUNTER FOR IMMUNIZATION: Primary | ICD-10-CM

## 2021-05-17 PROCEDURE — 91301 SARS-COV-2 / COVID-19 MRNA VACCINE (MODERNA) 100 MCG: CPT

## 2021-05-17 PROCEDURE — 0012A SARS-COV-2 / COVID-19 MRNA VACCINE (MODERNA) 100 MCG: CPT

## 2022-04-04 ENCOUNTER — ANNUAL EXAM (OUTPATIENT)
Dept: OBGYN CLINIC | Facility: CLINIC | Age: 37
End: 2022-04-04

## 2022-04-04 VITALS
SYSTOLIC BLOOD PRESSURE: 119 MMHG | DIASTOLIC BLOOD PRESSURE: 78 MMHG | WEIGHT: 215 LBS | BODY MASS INDEX: 33.67 KG/M2 | HEART RATE: 85 BPM

## 2022-04-04 DIAGNOSIS — Z01.419 ENCOUNTER FOR ANNUAL ROUTINE GYNECOLOGICAL EXAMINATION: Primary | ICD-10-CM

## 2022-04-04 PROBLEM — B95.1 GROUP BETA STREP POSITIVE: Status: RESOLVED | Noted: 2018-08-20 | Resolved: 2022-04-04

## 2022-04-04 PROBLEM — Z34.93 PRENATAL CARE IN THIRD TRIMESTER: Status: RESOLVED | Noted: 2018-07-05 | Resolved: 2022-04-04

## 2022-04-04 PROBLEM — Z64.1 MULTIPARITY: Status: RESOLVED | Noted: 2018-10-16 | Resolved: 2022-04-04

## 2022-04-04 PROBLEM — Z30.2 REQUEST FOR STERILIZATION: Status: RESOLVED | Noted: 2018-06-21 | Resolved: 2022-04-04

## 2022-04-04 PROCEDURE — 0503F POSTPARTUM CARE VISIT: CPT | Performed by: NURSE PRACTITIONER

## 2022-04-04 PROCEDURE — 99395 PREV VISIT EST AGE 18-39: CPT | Performed by: NURSE PRACTITIONER

## 2022-04-04 NOTE — PATIENT INSTRUCTIONS
Call with needs or concerns  Return in 1 year    COVID-19 Instructions    If you are having any of the following:  Cough   Shortness of breath   Fever  If traveled within past 2 weeks internationally or to high risk [place] states  Or been in contact with someone that has     Please call either:   Your PCP office  [phone], option [phone]    They will screen you over the phone and direct you to the nearest appropriate testing location  DO NOT go to your PCP or OB office without calling first     Thank you for your confidence in our team    We appreciate you and welcome your feedback  If you receive a survey from us, please take a few moments to let us know how we are doing     Sincerely,  ARAM Juarez

## 2022-04-04 NOTE — PROGRESS NOTES
Annual Exam    Assessment   1  Encounter for annual routine gynecological examination       well woman       Plan       All questions answered  Breast self exam technique reviewed and patient encouraged to perform self-exam monthly  Contraception: tubal ligation  Discussed healthy lifestyle modifications  Follow up in 1 year  Patient Instructions   Call with needs or concerns  Return in 1 year  Pt verbalized understanding of all discussed '      Subjective      Carol Perkins is a 31 y o   female who presents for annual well woman exam  Periods are regular every 28-30 days, lasting 5 days  No intermenstrual bleeding, spotting, or discharge  Last WNL PAP and negative HPV 2/15/2018   x 8 years, denies domestic violence      Depression Screening Follow-up Plan: Patient's depression screening was positive with a PHQ-2 score of 1  Their PHQ-9 score was 5   Clinically patient does not have depression  No treatment is required  Current contraception: tubal ligation  History of abnormal Pap smear: no  Family history of uterine or ovarian cancer: no  Regular self breast exam: yes  History of abnormal mammogram: N/A  Family history of breast cancer: no  History of abnormal lipids: unkmown  Menstrual History:  OB History        2    Para   2    Term   1            AB        Living   2       SAB        IAB        Ectopic        Multiple   0    Live Births   2                Menarche age: 8  Patient's last menstrual period was 2022 (exact date)  The following portions of the patient's history were reviewed and updated as appropriate: allergies, current medications, past family history, past medical history, past social history, past surgical history and problem list     Review of Systems  Pertinent items are noted in HPI        Objective      /78   Pulse 85   Wt 97 5 kg (215 lb)   LMP 2022 (Exact Date)   BMI 33 67 kg/m²     General: alert and oriented, in no acute distress, alert, appears stated age and cooperative   Heart: regular rate and rhythm, S1, S2 normal, no murmur, click, rub or gallop   Lungs: clear to auscultation bilaterally, WNL respiratory effort, negative cough or SOB   Thyroid: Negative masses   Abdomen: soft, non-tender, without masses or organomegaly   Vulva: normal   Vagina: normal mucosa   Cervix: no cervical motion tenderness and no lesions   Uterus: normal size   Adnexa: normal adnexa   Urethra: normal   Breasts: NT,negative masses, discharge, or dimpling

## 2022-11-04 NOTE — PROGRESS NOTES
OB/GYN  PN Visit  Steven Meyers  [id number]  2018  5:36 PM  Dr Chi Ko MD    S: 29 y o   38w2d here for PN visit  OB complaints:  Ctxns: Infrequent contractions, not regular  Declines cervical check today  Leakage: Denies  Bleeding: Denies  Fetal movement: Reports good fetal movement, meeting kick counts    Patient denies any complaints today  Desires check in card  O:  Vitals:    18 1629   BP: 115/74   Pulse: 88       Gen: no acute distress, nonlabored breathing  OB exam completed: fundal height 38 cm, FHTs 135, Vertex by ultrasound    A/P:  #1  38w2d GESTATION  Check in card given  Labor precautions reviewed and how to get a hold of OB on call  Fetal kick counts reviewed  RTC in 1 week    #2  HSV  Continue Valtrex 500mg PO daily    #3  GBS positive  Will require antibiotics in labor, patient aware  Not PCN allergic    #4   PP contraception:   Desires tubal ligation  MA 31 papers signed    Future Appointments  Date Time Provider Elizabeth Floresi   2018 4:30 PM Chi Ko MD [id number] [facility] MD Angelika  2018  5:36 PM
[FreeTextEntry2] : Right shoulder DOS: 9/8/22. \par

## 2023-05-12 ENCOUNTER — TELEPHONE (OUTPATIENT)
Dept: OBGYN CLINIC | Facility: CLINIC | Age: 38
End: 2023-05-12

## 2024-05-06 ENCOUNTER — OFFICE VISIT (OUTPATIENT)
Dept: OBGYN CLINIC | Facility: CLINIC | Age: 39
End: 2024-05-06

## 2024-05-06 VITALS
SYSTOLIC BLOOD PRESSURE: 117 MMHG | DIASTOLIC BLOOD PRESSURE: 77 MMHG | BODY MASS INDEX: 28.51 KG/M2 | HEART RATE: 114 BPM | WEIGHT: 182 LBS

## 2024-05-06 DIAGNOSIS — A60.00 HERPES SIMPLEX INFECTION OF GENITOURINARY SYSTEM: Primary | ICD-10-CM

## 2024-05-06 PROCEDURE — 99213 OFFICE O/P EST LOW 20 MIN: CPT | Performed by: NURSE PRACTITIONER

## 2024-05-06 RX ORDER — VALACYCLOVIR HYDROCHLORIDE 1 G/1
1000 TABLET, FILM COATED ORAL DAILY
Qty: 5 TABLET | Refills: 0 | Status: SHIPPED | OUTPATIENT
Start: 2024-05-06 | End: 2024-05-11

## 2024-05-06 NOTE — PROGRESS NOTES
Assessment/Plan:         Diagnoses and all orders for this visit:    Herpes simplex infection of genitourinary system  -     valACYclovir (VALTREX) 1,000 mg tablet; Take 1 tablet (1,000 mg total) by mouth daily for 5 days      Plan  Take Valtrex as directed  Remember safe sex and condom use  Call with needs or concerns  Keep annual GYN exam due 6/6/2024   Pt verbalized understanding of all discussed.      Subjective:      Patient ID: Carol Abarca is a 38 y.o. female.    HPI  Pt states last HSV outbreak was primary and about 10 years ago  Pt states she has 1 lesion since Friday that feels like when she had her first outbreak  Last WNL PAP and negative HPV was 2/15/2018    Explained 1 lesion was noted, valtrex would be ordered, 1 tablet daily x 5 days    Depression Screening Follow-up Plan: Patient's depression screening was negative with a PHQ-2 score of 3. Their PHQ-9 score was 6. Clinically patient does not have depression. No treatment is required.      The following portions of the patient's history were reviewed and updated as appropriate: allergies, current medications, past family history, past medical history, past social history, past surgical history, and problem list.    Review of Systems    .Pertinent items are note in the HPI      Objective:      /77 (BP Location: Right arm, Patient Position: Sitting)   Pulse (!) 114   Wt 82.6 kg (182 lb)   LMP 04/09/2024 (Exact Date)   Breastfeeding No   BMI 28.51 kg/m²          Physical Exam  Vitals reviewed.   Constitutional:       Appearance: Normal appearance.   Eyes:      General:         Right eye: No discharge.         Left eye: No discharge.   Pulmonary:      Effort: Pulmonary effort is normal. No respiratory distress.   Musculoskeletal:         General: Normal range of motion.      Cervical back: Normal range of motion.   Skin:     General: Skin is warm and dry.   Neurological:      Mental Status: She is alert and oriented to person, place, and  time.   Psychiatric:         Mood and Affect: Mood normal.         Behavior: Behavior normal.         Thought Content: Thought content normal.         L Vulva Small herpetic lesion noted on lower L side of Vulva, (no longer tender), negative erythema   R side of vulva, negative lesions or erythema

## 2024-05-06 NOTE — PATIENT INSTRUCTIONS
Take Valtrex as directed  Remember safe sex and condom use  Call with needs or concerns  Keep annual GYN exam due 6/6/2024

## 2024-06-06 ENCOUNTER — ANNUAL EXAM (OUTPATIENT)
Dept: OBGYN CLINIC | Facility: CLINIC | Age: 39
End: 2024-06-06

## 2024-06-06 VITALS
SYSTOLIC BLOOD PRESSURE: 128 MMHG | WEIGHT: 191.2 LBS | HEART RATE: 98 BPM | DIASTOLIC BLOOD PRESSURE: 81 MMHG | BODY MASS INDEX: 30.01 KG/M2 | HEIGHT: 67 IN

## 2024-06-06 DIAGNOSIS — Z01.419 ENCOUNTER FOR ANNUAL ROUTINE GYNECOLOGICAL EXAMINATION: Primary | ICD-10-CM

## 2024-06-06 PROCEDURE — S0612 ANNUAL GYNECOLOGICAL EXAMINA: HCPCS | Performed by: NURSE PRACTITIONER

## 2024-06-06 PROCEDURE — G0145 SCR C/V CYTO,THINLAYER,RESCR: HCPCS | Performed by: NURSE PRACTITIONER

## 2024-06-06 PROCEDURE — G0476 HPV COMBO ASSAY CA SCREEN: HCPCS | Performed by: NURSE PRACTITIONER

## 2024-06-06 NOTE — PROGRESS NOTES
Annual Exam    Assessment   1. Encounter for annual routine gynecological examination  Liquid-based pap, screening        well woman       Plan       All questions answered.  Breast self exam technique reviewed and patient encouraged to perform self-exam monthly.  Contraception: tubal ligation.  Discussed healthy lifestyle modifications.  Follow up in 1 year.  Thin prep Pap smear.     Patient Instructions   PAP results can take up to 2 weeks  Call with needs or concerns  Return in 1 year  [facility] [phone]    Subjective      Carol Abarca is a 38 y.o.  female who presents for annual well woman exam. Periods are regular every 28-30 days, lasting 3 days. No intermenstrual bleeding, spotting, or discharge.  1 partner x 10 years, denies domestic violence    Depression Screening Follow-up Plan: Patient's depression screening was negative with a PHQ-2 score of 1. Their PHQ-9 score was 8. Clinically patient does not have depression. No treatment is required.      Current contraception: tubal ligation  History of abnormal Pap smear: no  Family history of uterine or ovarian cancer: no  Regular self breast exam: yes  History of abnormal mammogram: N/A  Family history of breast cancer: yes - maternal Aunt, Cousin [facility]  History of abnormal lipids: unknown  Menstrual History:  OB History          2    Para   2    Term   1            AB        Living   2         SAB        IAB        Ectopic        Multiple   0    Live Births   2                Menarche age: 89  Patient's last menstrual period was 2024 (exact date).       The following portions of the patient's history were reviewed and updated as appropriate: allergies, current medications, past family history, past medical history, past social history, past surgical history and problem list.    Review of Systems  Pertinent items are noted in HPI.      Objective      /81 (BP Location: Left arm, Patient Position: Sitting, Cuff Size:  "Large)   Pulse 98   Ht 5' 7\" (1.702 m)   Wt 86.7 kg (191 lb 3.2 oz)   LMP 05/06/2024 (Exact Date)   BMI 29.95 kg/m²     General: alert and oriented, in no acute distress, alert, appears stated age, and cooperative   Heart: NSR   Lungs: clear to auscultation bilaterally, WNL respiratory effort, negative cough or SOB   Thyroid: Negative masses palpable   Abdomen: soft, non-tender, without masses or organomegaly   Vulva: normal   Vagina: normal mucosa   Cervix: no bleeding following Pap, no cervical motion tenderness, and no lesions   Uterus: normal size, non-tender, normal shape and consistency   Adnexa: normal adnexa   Urethra: normal   Breasts: NT,negative masses palpable, negative discharge, or dimpling             "

## 2024-06-06 NOTE — PATIENT INSTRUCTIONS
PAP results can take up to 2 weeks  Call with needs or concerns  Return in 1 year  HOPE Line [phone]

## 2024-06-07 LAB
HPV HR 12 DNA CVX QL NAA+PROBE: NEGATIVE
HPV16 DNA CVX QL NAA+PROBE: NEGATIVE
HPV18 DNA CVX QL NAA+PROBE: NEGATIVE

## 2024-06-13 LAB
LAB AP GYN PRIMARY INTERPRETATION: NORMAL
Lab: NORMAL

## (undated) DEVICE — GLOVE INDICATOR PI UNDERGLOVE SZ 7.5 BLUE

## (undated) DEVICE — IRRIG ENDO FLO TUBING

## (undated) DEVICE — TRAY FOLEY 16FR URIMETER SURESTEP

## (undated) DEVICE — 3000CC GUARDIAN II: Brand: GUARDIAN

## (undated) DEVICE — ENDOPATH XCEL UNIVERSAL TROCAR STABLILITY SLEEVES: Brand: ENDOPATH XCEL

## (undated) DEVICE — INTENDED FOR TISSUE SEPARATION, AND OTHER PROCEDURES THAT REQUIRE A SHARP SURGICAL BLADE TO PUNCTURE OR CUT.: Brand: BARD-PARKER SAFETY BLADES SIZE 11, STERILE

## (undated) DEVICE — ADHESIVE SKN CLSR HISTOACRYL FLEX 0.5ML LF

## (undated) DEVICE — STERILE MINOR LAPAROSCOPY PACK: Brand: CARDINAL HEALTH

## (undated) DEVICE — CHLORAPREP HI-LITE 26ML ORANGE

## (undated) DEVICE — ANTI-FOG SOLUTION WITH FOAM PAD: Brand: DEVON

## (undated) DEVICE — LYOFOAM TRAY 4 X 4

## (undated) DEVICE — NEEDLE 25G X 1 1/2

## (undated) DEVICE — GLOVE SRG BIOGEL ECLIPSE 7

## (undated) DEVICE — ENDOPATH XCEL BLADELESS TROCARS WITH STABILITY SLEEVES: Brand: ENDOPATH XCEL

## (undated) DEVICE — SUT MONOCRYL 4-0 PS-2 18 IN Y496G